# Patient Record
Sex: FEMALE | Race: BLACK OR AFRICAN AMERICAN | NOT HISPANIC OR LATINO | Employment: UNEMPLOYED | URBAN - METROPOLITAN AREA
[De-identification: names, ages, dates, MRNs, and addresses within clinical notes are randomized per-mention and may not be internally consistent; named-entity substitution may affect disease eponyms.]

---

## 2017-11-11 ENCOUNTER — HOSPITAL ENCOUNTER (EMERGENCY)
Facility: HOSPITAL | Age: 47
Discharge: HOME | End: 2017-11-11
Attending: EMERGENCY MEDICINE | Admitting: EMERGENCY MEDICINE
Payer: COMMERCIAL

## 2017-11-11 VITALS
HEIGHT: 64 IN | OXYGEN SATURATION: 99 % | HEART RATE: 93 BPM | BODY MASS INDEX: 29.02 KG/M2 | WEIGHT: 170 LBS | RESPIRATION RATE: 18 BRPM | SYSTOLIC BLOOD PRESSURE: 145 MMHG | DIASTOLIC BLOOD PRESSURE: 83 MMHG | TEMPERATURE: 97.9 F

## 2017-11-11 DIAGNOSIS — K04.7 DENTAL ABSCESS: Primary | ICD-10-CM

## 2017-11-11 PROCEDURE — 99282 EMERGENCY DEPT VISIT SF MDM: CPT

## 2017-11-11 RX ORDER — IBUPROFEN 600 MG/1
600 TABLET ORAL EVERY 6 HOURS PRN
Qty: 30 TABLET | Refills: 0 | Status: SHIPPED | OUTPATIENT
Start: 2017-11-11

## 2017-11-11 RX ORDER — IBUPROFEN 600 MG/1
600 TABLET ORAL ONCE
Status: COMPLETED | OUTPATIENT
Start: 2017-11-11 | End: 2017-11-11

## 2017-11-11 RX ORDER — CLINDAMYCIN HYDROCHLORIDE 150 MG/1
300 CAPSULE ORAL ONCE
Status: COMPLETED | OUTPATIENT
Start: 2017-11-11 | End: 2017-11-11

## 2017-11-11 RX ORDER — CLINDAMYCIN HYDROCHLORIDE 300 MG/1
300 CAPSULE ORAL 4 TIMES DAILY
Qty: 40 CAPSULE | Refills: 0 | Status: SHIPPED | OUTPATIENT
Start: 2017-11-11 | End: 2017-11-21

## 2017-11-11 RX ADMIN — CLINDAMYCIN HYDROCHLORIDE 300 MG: 150 CAPSULE ORAL at 22:45

## 2017-11-11 RX ADMIN — IBUPROFEN 600 MG: 600 TABLET ORAL at 22:45

## 2017-11-12 NOTE — DISCHARGE INSTRUCTIONS
Dental Abscess   WHAT YOU NEED TO KNOW:   A dental abscess is a collection of pus in or around a tooth  A dental abscess is caused by bacteria  The bacteria usually enter the tooth when the enamel (outer part of the tooth) is damaged by tooth decay  Bacteria may also enter the tooth through a break or chip in the tooth, or a cut in the gum  Food particles that are stuck between the teeth for a long time may also lead to an abscess  DISCHARGE INSTRUCTIONS:   Return to the emergency department if:   · You have severe pain  · You have trouble breathing because of pain or swelling  Contact your healthcare provider if:   · Your symptoms get worse, even after treatment  · Your mouth is bleeding  · You cannot eat or drink because of pain or swelling  · Your abscess returns  · You have an injury that causes a crack in your tooth  · You have questions or concerns about your condition or care  Medicines: You may  need any of the following:  · Antibiotics  help treat a bacterial infection  · NSAIDs , such as ibuprofen, help decrease swelling, pain, and fever  This medicine is available with or without a doctor's order  NSAIDs can cause stomach bleeding or kidney problems in certain people  If you take blood thinner medicine, always ask your healthcare provider if NSAIDs are safe for you  Always read the medicine label and follow directions  · Acetaminophen  decreases pain and fever  It is available without a doctor's order  Ask how much to take and how often to take it  Follow directions  Read the labels of all other medicines you are using to see if they also contain acetaminophen, or ask your doctor or pharmacist  Acetaminophen can cause liver damage if not taken correctly  Do not use more than 4 grams (4,000 milligrams) total of acetaminophen in one day  · Prescription pain medicine  may be given  Ask your healthcare provider how to take this medicine safely   Some prescription pain medicines contain acetaminophen  Do not take other medicines that contain acetaminophen without talking to your healthcare provider  Too much acetaminophen may cause liver damage  Prescription pain medicine may cause constipation  Ask your healthcare provider how to prevent or treat constipation  · Take your medicine as directed  Contact your healthcare provider if you think your medicine is not helping or if you have side effects  Tell him of her if you are allergic to any medicine  Keep a list of the medicines, vitamins, and herbs you take  Include the amounts, and when and why you take them  Bring the list or the pill bottles to follow-up visits  Carry your medicine list with you in case of an emergency  Self-care:   · Rinse your mouth every 2 hours with salt water  This will help keep the area clean  · Gently brush your teeth twice a day with a soft tooth brush  This will help keep the area clean  · Eat soft foods as directed  Soft foods may cause less pain  Examples include applesauce, yogurt, and cooked pasta  Ask your healthcare provider how long to follow this instruction  · Apply a warm compress to your tooth or gum  Use a cotton ball or gauze soaked in warm water  Remove the compress in 10 minutes or when it becomes cool  Repeat 3 times a day  Prevent another abscess:   · Brush your teeth at least 2 times a day with fluoride toothpaste  · Use dental floss to clean between your teeth at least once a day  · Rinse your mouth with water or mouthwash after meals and snacks  · Chew sugarless gum after meals and snacks  · Limit foods that are sticky and high in sugar such as raisons  Also limit drinks high in sugar, such as soda  · See your dentist every 6 months for dental cleanings and oral exams  Follow up with your healthcare provider in 24 hours: Your healthcare provider will need to check your teeth and gums   Write down your questions so you remember to ask them during your visits  © 2017 [employer] Information is for End User's use only and may not be sold, redistributed or otherwise used for commercial purposes  All illustrations and images included in CareNotes® are the copyrighted property of [employer]  or [employer]  The above information is an  only  It is not intended as medical advice for individual conditions or treatments  Talk to your doctor, nurse or pharmacist before following any medical regimen to see if it is safe and effective for you

## 2017-11-17 NOTE — ED PROVIDER NOTES
History  Chief Complaint   Patient presents with    Dental Pain     Pt c/o of Upper left tooth pain from tooth that had previously cracked  53-year-old female patient presents emergency department for evaluation of tooth pain  Patient has a cracked tooth  The patient states that she has tried to see dentist but was unable to  Patient will be treated for presumptive dental abscess  History provided by:  Patient   used: No    Dental Pain   Location:  Upper  Quality:  Aching  Severity:  Moderate  Onset quality:  Gradual  Timing:  Constant  Progression:  Worsening  Chronicity:  New  Context: not abscess, not crown fracture, not dental caries, not enamel fracture, filling intact, not intrusion, not malocclusion, normal dentition and not recent dental surgery    Relieved by:  Nothing  Worsened by:  Nothing  Ineffective treatments:  None tried  Associated symptoms: no difficulty swallowing, no headaches and no neck pain        None       History reviewed  No pertinent past medical history  History reviewed  No pertinent surgical history  History reviewed  No pertinent family history  I have reviewed and agree with the history as documented  Social History   Substance Use Topics    Smoking status: Never Smoker    Smokeless tobacco: Never Used    Alcohol use No        Review of Systems   Musculoskeletal: Negative for neck pain  Neurological: Negative for headaches  All other systems reviewed and are negative        Physical Exam  ED Triage Vitals   Temperature Pulse Respirations Blood Pressure SpO2   11/11/17 2237 11/11/17 2235 11/11/17 2235 11/11/17 2235 11/11/17 2235   97 9 °F (36 6 °C) 93 18 145/83 99 %      Temp Source Heart Rate Source Patient Position - Orthostatic VS BP Location FiO2 (%)   11/11/17 2237 11/11/17 2235 11/11/17 2235 11/11/17 2235 --   Temporal Monitor Lying Right arm       Pain Score       --                  Orthostatic Vital Signs  Vitals: 11/11/17 2235   BP: 145/83   Pulse: 93   Patient Position - Orthostatic VS: Lying       Physical Exam   Constitutional: She is oriented to person, place, and time  She appears well-developed and well-nourished  HENT:   Head: Normocephalic and atraumatic  Right Ear: External ear normal    Left Ear: External ear normal    Eyes: Conjunctivae and EOM are normal    Neck: No JVD present  No tracheal deviation present  No thyromegaly present  Cardiovascular: Normal rate  Pulmonary/Chest: Effort normal and breath sounds normal  No stridor  Abdominal: Soft  She exhibits no distension and no mass  There is no tenderness  There is no guarding  No hernia  Musculoskeletal: Normal range of motion  She exhibits no edema, tenderness or deformity  Lymphadenopathy:     She has no cervical adenopathy  Neurological: She is alert and oriented to person, place, and time  Skin: Skin is warm  No rash noted  No erythema  No pallor  Psychiatric: She has a normal mood and affect  Her behavior is normal    Nursing note and vitals reviewed        ED Medications  Medications   clindamycin (CLEOCIN) capsule 300 mg (300 mg Oral Given 11/11/17 2245)   ibuprofen (MOTRIN) tablet 600 mg (600 mg Oral Given 11/11/17 2245)       Diagnostic Studies  Results Reviewed     None                 No orders to display              Procedures  Procedures       Phone Contacts  ED Phone Contact    ED Course  ED Course                                MDM  Number of Diagnoses or Management Options  Dental abscess: new and requires workup     Amount and/or Complexity of Data Reviewed  Decide to obtain previous medical records or to obtain history from someone other than the patient: yes  Review and summarize past medical records: yes    Patient Progress  Patient progress: stable    CritCare Time    Disposition  Final diagnoses:   Dental abscess     Time reflects when diagnosis was documented in both MDM as applicable and the Disposition within this note     Time User Action Codes Description Comment    11/11/2017 10:40 PM Wei Galvez Add [K04 7] Dental abscess       ED Disposition     ED Disposition Condition Comment    Discharge  [facility] discharge to home/self care  Condition at discharge: Good        Follow-up Information     Follow up With Specialties Details Why Contact Info Additional Information    [facility] Emergency Department Emergency Medicine  As needed [facility] [address]  [phone] [place] ED, [address] [place] [address], [address] [place] [address]  [phone]         Discharge Medication List as of 11/11/2017 10:52 PM      START taking these medications    Details   benzocaine (HURRICAINE) 20 % 1 application by Mucosal route 3 (three) times a day as needed for mucositis, Starting Sat 11/11/2017, Print      clindamycin (CLEOCIN) 300 MG capsule Take 1 capsule by mouth 4 (four) times a day for 10 days, Starting Sat 11/11/2017, Until Tue 11/21/2017, Print      ibuprofen (MOTRIN) 600 mg tablet Take 1 tablet by mouth every 6 (six) hours as needed for mild pain, Starting Sat 11/11/2017, Print           No discharge procedures on file      ED Provider  Electronically Signed by           Steve Harman DO  11/17/17 6685

## 2017-12-07 ENCOUNTER — HOSPITAL ENCOUNTER (EMERGENCY)
Facility: HOSPITAL | Age: 47
Discharge: HOME | End: 2017-12-08
Attending: EMERGENCY MEDICINE | Admitting: EMERGENCY MEDICINE
Payer: COMMERCIAL

## 2017-12-07 VITALS
SYSTOLIC BLOOD PRESSURE: 148 MMHG | RESPIRATION RATE: 16 BRPM | HEART RATE: 89 BPM | TEMPERATURE: 98.5 F | OXYGEN SATURATION: 98 % | WEIGHT: 165 LBS | DIASTOLIC BLOOD PRESSURE: 89 MMHG | BODY MASS INDEX: 28.32 KG/M2

## 2017-12-07 DIAGNOSIS — Z72.0 TOBACCO ABUSE: ICD-10-CM

## 2017-12-07 DIAGNOSIS — J06.9 URI (UPPER RESPIRATORY INFECTION): Primary | ICD-10-CM

## 2017-12-08 ENCOUNTER — APPOINTMENT (EMERGENCY)
Dept: RADIOLOGY | Facility: HOSPITAL | Age: 47
End: 2017-12-08
Payer: COMMERCIAL

## 2017-12-08 PROCEDURE — 99283 EMERGENCY DEPT VISIT LOW MDM: CPT

## 2017-12-08 PROCEDURE — 71020 HB CHEST X-RAY 2VW FRONTAL&LATL: CPT

## 2017-12-08 RX ORDER — PREDNISONE 20 MG/1
60 TABLET ORAL DAILY
Qty: 15 TABLET | Refills: 0 | Status: SHIPPED | OUTPATIENT
Start: 2017-12-08 | End: 2017-12-13

## 2017-12-08 RX ORDER — LEVOFLOXACIN 500 MG/1
500 TABLET, FILM COATED ORAL DAILY
Qty: 10 TABLET | Refills: 0 | Status: SHIPPED | OUTPATIENT
Start: 2017-12-08 | End: 2017-12-18

## 2017-12-08 RX ORDER — ALBUTEROL SULFATE 90 UG/1
2 AEROSOL, METERED RESPIRATORY (INHALATION) ONCE
Status: COMPLETED | OUTPATIENT
Start: 2017-12-08 | End: 2017-12-08

## 2017-12-08 RX ORDER — PREDNISONE 20 MG/1
60 TABLET ORAL ONCE
Status: COMPLETED | OUTPATIENT
Start: 2017-12-08 | End: 2017-12-08

## 2017-12-08 RX ORDER — ALBUTEROL SULFATE 90 UG/1
2 AEROSOL, METERED RESPIRATORY (INHALATION) EVERY 4 HOURS PRN
Qty: 1 INHALER | Refills: 0 | Status: SHIPPED | OUTPATIENT
Start: 2017-12-08 | End: 2020-05-21

## 2017-12-08 RX ADMIN — PREDNISONE 60 MG: 20 TABLET ORAL at 00:14

## 2017-12-08 RX ADMIN — ALBUTEROL SULFATE 2 PUFF: 90 AEROSOL, METERED RESPIRATORY (INHALATION) at 00:16

## 2017-12-08 NOTE — ED PROVIDER NOTES
History  Chief Complaint   Patient presents with    Cough     pt started with a cough today as well as a headache and pain in her chest when she coughs     59-year-old female patient presents emergency department for evaluation of URI symptoms which has been present for approximately 12 hours  The patient is a pack-per-day smoker, has been smoking since she was 14years old this current 49years old  The patient does have symptoms consistent with COPD which are likely exacerbated by URI  Her significant other in the room was diagnosed last night with pneumonia  Patient had an x-ray done as she does have adventitious breath sounds equally bilaterally which are mode as a combination of rales and wheezing  The patient has aware of this, did discuss with her smoking cessation, I spent greater than 10 minutes discussing this with her, she stated that she was trying to get on to Chantix per primary care provider was unable to do so  I will provide the patient's information on contact for assistance in smoking cessation  History provided by:  Patient   used: No    Cough   Cough characteristics:  Productive  Sputum characteristics:  Nondescript  Severity:  Mild  Onset quality:  Gradual  Timing:  Constant  Progression:  Worsening  Chronicity:  New  Smoker: yes    Context: upper respiratory infection    Context: not exposure to allergens, not fumes, not sick contacts and not smoke exposure    Relieved by:  Nothing  Worsened by:  Nothing  Ineffective treatments:  None tried  Associated symptoms: no chest pain, no diaphoresis, no ear pain, no myalgias, no rash, no shortness of breath and no sinus congestion        Prior to Admission Medications   Prescriptions Last Dose Informant Patient Reported?  Taking?   benzocaine (HURRICAINE) 20 %   No No   Si application by Mucosal route 3 (three) times a day as needed for mucositis   ibuprofen (MOTRIN) 600 mg tablet   No No   Sig: Take 1 tablet by mouth every 6 (six) hours as needed for mild pain      Facility-Administered Medications: None       History reviewed  No pertinent past medical history  Past Surgical History:   Procedure Laterality Date    HYSTERECTOMY         History reviewed  No pertinent family history  I have reviewed and agree with the history as documented  Social History   Substance Use Topics    Smoking status: Current Every Day Smoker     Packs/day: 0 20    Smokeless tobacco: Never Used    Alcohol use No        Review of Systems   Constitutional: Negative for diaphoresis  HENT: Negative for ear pain  Respiratory: Positive for cough  Negative for shortness of breath  Cardiovascular: Negative for chest pain  Musculoskeletal: Negative for myalgias  Skin: Negative for rash  All other systems reviewed and are negative  Physical Exam  ED Triage Vitals [12/07/17 2352]   Temperature Pulse Respirations Blood Pressure SpO2   98 5 °F (36 9 °C) 89 16 148/89 98 %      Temp Source Heart Rate Source Patient Position - Orthostatic VS BP Location FiO2 (%)   Oral Monitor Sitting Right arm --      Pain Score       --           Orthostatic Vital Signs  Vitals:    12/07/17 2352   BP: 148/89   Pulse: 89   Patient Position - Orthostatic VS: Sitting       Physical Exam   Constitutional: She is oriented to person, place, and time  She appears well-developed and well-nourished  HENT:   Head: Normocephalic and atraumatic  Right Ear: External ear normal    Left Ear: External ear normal    Eyes: Conjunctivae and EOM are normal    Neck: No JVD present  No tracheal deviation present  No thyromegaly present  Cardiovascular: Normal rate  Pulmonary/Chest: Effort normal  No stridor  She has wheezes  She has rales  Abdominal: Soft  She exhibits no distension and no mass  There is no tenderness  There is no guarding  No hernia  Musculoskeletal: Normal range of motion  She exhibits no edema, tenderness or deformity  Lymphadenopathy:     She has no cervical adenopathy  Neurological: She is alert and oriented to person, place, and time  Skin: Skin is warm  No rash noted  No erythema  No pallor  Psychiatric: She has a normal mood and affect  Her behavior is normal    Nursing note and vitals reviewed  ED Medications  Medications   albuterol (PROVENTIL HFA,VENTOLIN HFA) inhaler 2 puff (not administered)   predniSONE tablet 60 mg (not administered)       Diagnostic Studies  Results Reviewed     None                 XR chest 2 views    (Results Pending)              Procedures  Procedures       Phone Contacts  ED Phone Contact    ED Course  ED Course                                MDM  Number of Diagnoses or Management Options  Tobacco abuse: new and requires workup  URI (upper respiratory infection): new and requires workup     Amount and/or Complexity of Data Reviewed  Clinical lab tests: ordered and reviewed  Tests in the radiology section of CPT®: ordered and reviewed  Decide to obtain previous medical records or to obtain history from someone other than the patient: yes  Review and summarize past medical records: yes    Patient Progress  Patient progress: stable    CritCare Time    Disposition  Final diagnoses:   None     ED Disposition     None      Follow-up Information    None       Patient's Medications   Discharge Prescriptions    No medications on file     No discharge procedures on file      ED Provider  Electronically Signed by           Audrey Leigh DO  12/08/17 6293

## 2017-12-08 NOTE — DISCHARGE INSTRUCTIONS
Acute Bronchitis   WHAT YOU NEED TO KNOW:   Acute bronchitis is swelling and irritation in the air passages of your lungs  This irritation may cause you to cough or have other breathing problems  Acute bronchitis often starts because of another illness, such as a cold or the flu  The illness spreads from your nose and throat to your windpipe and airways  Bronchitis is often called a chest cold  Acute bronchitis lasts about 3 to 6 weeks and is usually not a serious illness  Your cough can last for several weeks  DISCHARGE INSTRUCTIONS:   Return to the emergency department if:   · You cough up blood  · Your lips or fingernails turn blue  · You feel like you are not getting enough air when you breathe  Contact your healthcare provider if:   · You have a fever  · Your breathing problems do not go away or get worse  · Your cough does not get better within 4 weeks  · You have questions or concerns about your condition or care  Self-care:   · Get more rest   Rest helps your body to heal  Slowly start to do more each day  Rest when you feel it is needed  · Avoid irritants in the air  Avoid chemicals, fumes, and dust  Wear a face mask if you must work around dust or fumes  Stay inside on days when air pollution levels are high  If you have allergies, stay inside when pollen counts are high  Do not use aerosol products, such as spray-on deodorant, bug spray, and hair spray  · Do not smoke or be around others who smoke  Nicotine and other chemicals in cigarettes and cigars damages the cilia that move mucus out of your lungs  Ask your healthcare provider for information if you currently smoke and need help to quit  E-cigarettes or smokeless tobacco still contain nicotine  Talk to your healthcare provider before you use these products  · Drink liquids as directed  Liquids help keep your air passages moist and help you cough up mucus   You may need to drink more liquids when you have acute bronchitis  Ask how much liquid to drink each day and which liquids are best for you  · Use a humidifier or vaporizer  Use a cool mist humidifier or a vaporizer to increase air moisture in your home  This may make it easier for you to breathe and help decrease your cough  Decrease risk for acute bronchitis:   · Get the vaccinations you need  Ask your healthcare provider if you should get vaccinated against the flu or pneumonia  · Prevent the spread of germs  You can decrease your risk of acute bronchitis and other illnesses by doing the following:     [facility] [facility] your hands often with soap and water  Carry germ-killing hand lotion or gel with you  You can use the lotion or gel to clean your hands when soap and water are not available  ¨ Do not touch your eyes, nose, or mouth unless you have washed your hands first     ¨ Always cover your mouth when you cough to prevent the spread of germs  It is best to cough into a tissue or your shirt sleeve instead of into your hand  Ask those around you cover their mouths when they cough  ¨ Try to avoid people who have a cold or the flu  If you are sick, stay away from others as much as possible  Medicines: Your healthcare provider may  give you any of the following:  · Ibuprofen or acetaminophen  are medicines that help lower your fever  They are available without a doctor's order  Ask your healthcare provider which medicine is right for you  Ask how much to take and how often to take it  Follow directions  These medicines can cause stomach bleeding if not taken correctly  Ibuprofen can cause kidney damage  Do not take ibuprofen if you have kidney disease, an ulcer, or allergies to aspirin  Acetaminophen can cause liver damage  Do not take more than 4,000 milligrams in 24 hours  · Decongestants  help loosen mucus in your lungs and make it easier to cough up  This can help you breathe easier  · Cough suppressants  decrease your urge to cough   If your cough produces mucus, do not take a cough suppressant unless your healthcare provider tells you to  Your healthcare provider may suggest that you take a cough suppressant at night so you can rest     · Inhalers  may be given  Your healthcare provider may give you one or more inhalers to help you breathe easier and cough less  An inhaler gives your medicine to open your airways  Ask your healthcare provider to show you how to use your inhaler correctly  · Take your medicine as directed  Contact your healthcare provider if you think your medicine is not helping or if you have side effects  Tell him of her if you are allergic to any medicine  Keep a list of the medicines, vitamins, and herbs you take  Include the amounts, and when and why you take them  Bring the list or the pill bottles to follow-up visits  Carry your medicine list with you in case of an emergency  Follow up with your healthcare provider as directed:  Write down questions you have so you will remember to ask them during your follow-up visits  © 2017 [address] Information is for End User's use only and may not be sold, redistributed or otherwise used for commercial purposes  All illustrations and images included in CareNotes® are the copyrighted property of [employer]  or [employer]  The above information is an  only  It is not intended as medical advice for individual conditions or treatments  Talk to your doctor, nurse or pharmacist before following any medical regimen to see if it is safe and effective for you  Cigarette Smoking and Your Health, Ambulatory Care   GENERAL INFORMATION:   What are the risks to my health if I smoke? Chemicals in tobacco are addictive and damage every cell in your body  All tobacco products are dangerous to you and to nonsmokers who breathe your secondhand smoke   Even if you are a light smoker or a social smoker, you have an increased risk for cancer, heart disease, and lung disease  If you are pregnant or have diabetes, smoking increases your risk for complications  What are the benefits to my health if I stop smoking? · Lower risk of cancer, heart disease, blood clots, heart attack, and stroke    · Lower risk of diabetes complications, such as kidney, artery, or eye disease, and nerve damage that can result in amputations    · Lower risk of lung infections and diseases, such as pneumonia, asthma, chronic bronchitis, and emphysema           · Increased benefits of chemotherapy if you already have cancer, and decreased risk for cancer returning after treatment    · Improved circulation, allowing more oxygen to be delivered to your body    · Improved ability to heal and to fight infections  What are the benefits to the health of others if I stop smoking? · Lower risk of lung cancer and heart disease in nonsmokers    · Lower risk of miscarriage, early delivery, low birth weight, and stillbirth    · Lower risk of SIDS, obesity, developmental delay, ear infections, colds, pneumonia, bronchitis, asthma, and ADHD in your child  Where can I find more information and support to stop smoking? It is never too late to stop smoking  Ask your healthcare provider for more information if you need help  · [employer]  Phone: 1- 189 - [phone]  Web Address: www [employer]  Follow up with your healthcare provider as directed:  Write down your questions so you remember to ask them during your visits  CARE AGREEMENT:   You have the right to help plan your care  Learn about your health condition and how it may be treated  Discuss treatment options with your caregivers to decide what care you want to receive  You always have the right to refuse treatment  The above information is an  only  It is not intended as medical advice for individual conditions or treatments   Talk to your doctor, nurse or pharmacist before following any medical regimen to see if it is safe and effective for you  © 2014 [address] is for End User's use only and may not be sold, redistributed or otherwise used for commercial purposes  All illustrations and images included in CareNotes® are the copyrighted property of [employer]  or Hiram Nova  How to Quit Using Smokeless Tobacco   AMBULATORY CARE:   Why it is important to stop using smokeless tobacco:  Smokeless tobacco comes in many forms  Examples include chew, snuff, dip, dissolvable tobacco, and snus  All smokeless tobacco products contain nicotine and may contain as much nicotine as 3 cigarettes  You may be physically dependent on nicotine  You may also be emotionally addicted to it  The cravings can be strong, but it is important to quit using smokeless tobacco  You will improve your health and decrease your cancer, stroke, and heart attack risk  Mouth sores and tooth problems will also improve when you quit  You can benefit from quitting no matter how long you have used smokeless tobacco    Signs and symptoms of nicotine withdrawal include the following:   · Dizziness, headaches, or tiredness     · Depression, anxiety, or irritability     · Trouble sleeping or concentrating     · Feeling restless or bored  Prepare to stop using smokeless tobacco:  Nicotine is a highly addictive drug  Withdrawal symptoms can happen when you stop and make it hard to quit  The following can help keep you on track:  · Set a quit date  If possible, set the date about 3 to 4 weeks in the future  This will help you prepare your home and routine for the change  Do not set the date too far away  You might change your mind or lose your resolve to quit  Know the triggers that tempt you, and make a plan to avoid them  · Tell friends, family, and coworkers that you plan to quit  Explain that you may have withdrawal symptoms when you quit  Ask them to support you   They may be able to encourage you and help reduce your stress to make it easier for you to quit  Ask them not to use any tobacco products around you  Do not allow them to use tobacco products in your home or car  · Remove all smokeless tobacco products from your home, car, and workplace  Remove anything else that will tempt you  Tools to help you quit:   · Counseling  from a healthcare provider can provide you with support and skills to quit  The counselor can also teach you to manage your withdrawal symptoms and cravings  He may help you learn methods such as meditation that can help you feel less anxious or jittery  You may receive counseling from one counselor, in group therapy, or through phone therapy called a quit line  · Taper down  means you use less smokeless tobacco each day until your body no longer craves the nicotine  You can also reduce the number of places you use it or use a different kind that has less nicotine  Wait as long as possible before you use smokeless tobacco when you have a craving  You may be able to increase the amount of time you can wait after each craving  This will help decrease the amount you use and the number of cravings each day  · Nicotine replacement therapy (NRT)  such as patches, gum, or lozenges may help reduce your nicotine cravings and withdrawal symptoms  NRT is available without a doctor's order  Follow directions so you do not get too much nicotine  An overdose can be life-threatening  Do not switch to cigarettes as a way to quit using smokeless tobacco  If you are pregnant or have heart disease, talk to your healthcare provider before you start NRT  He may recommend other ways to quit, or he may want you to come in for follow-up visits while you use NRT  · Prescription medicines  such as nasal sprays or nicotine inhalers may help reduce your withdrawal symptoms  Ask your healthcare provider about these and other medicines to help reduce cravings   You may need to start certain medicines 2 weeks before your quit date for them to work well  · Chew sugarless gum or sunflower seeds  as a substitute for smokeless tobacco   Manage weight gain after you quit:  Nicotine can affect your metabolism  You may gain a few pounds after you quit  The following can help you control your weight:  · Eat healthy foods  Healthy foods include fruits, vegetables, whole-grain breads, low-fat dairy products, beans, lean meats, and fish  You may also find it helpful to chew sugarless gum or eat healthy snacks  · Drink water before, during, and between meals  This will make your stomach feel full and help prevent you from overeating  Ask your healthcare provider how much liquid to drink each day and which liquids are best for you  · Exercise as directed  Exercise may help reduce cravings and stress from nicotine withdrawal  Take a walk or do some kind of exercise every day  © 2017 [address]  Information is for End User's use only and may not be sold, redistributed or otherwise used for commercial purposes  All illustrations and images included in CareNotes® are the copyrighted property of [employer]  or Hiram Nova  The above information is an  only  It is not intended as medical advice for individual conditions or treatments  Talk to your doctor, nurse or pharmacist before following any medical regimen to see if it is safe and effective for you  How to Stop Smoking   WHAT YOU NEED TO KNOW:   You will improve your health and the health of others around you if you stop smoking  Your risk for heart and lung disease, cancer, stroke, heart attack, and vision problems will also decrease  You can benefit from quitting no matter how long you have smoked  DISCHARGE INSTRUCTIONS:   Prepare to stop smoking:  Nicotine is a highly addictive drug found in cigarettes  Withdrawal symptoms can happen when you stop smoking and make it hard to quit   These include anxiety, depression, irritability, trouble sleeping, and increased appetite  You increase your chances of success if you prepare to quit  · Set a quit date  [place] a date that is within the next 2 weeks  Do not pick a day that you think may be stressful or busy  Write down the day or [place] it on your calender  · Tell friends and family that you plan to quit  Explain that you may have withdrawal symptoms when you try to quit  Ask them to support you  They may be able to encourage you and help reduce your stress to make it easier for you to quit  · Make a list of your reasons for quitting  Put the list somewhere you will see it every day, such as your refrigerator  You can look at the list when you have a craving  · Remove all tobacco and nicotine products from your home, car, and workplace  Also, remove anything else that will tempt you to smoke, such as lighters, matches, or ashtrays  Clean your car, home, and places at work that smell like smoke  The smell of smoke can trigger a craving  · Identify triggers that make you want to smoke  This may include activities, feelings, or people  Also write down 1 way you can deal with each of your triggers  For example, if you want to smoke as soon as you wake up, plan another activity during this time, such as exercise  · Make a plan for how you will quit  Learn about the tools that can help you quit, such as medicine, counseling, or nicotine replacement therapy  Choose at least 2 options to help you quit  Tools to help you stop smoking:   · Counseling  from a trained healthcare provider can provide you with support and skills to quit smoking  The provider will also teach you to manage your withdrawal symptoms and cravings  You may receive counseling from one counselor, in group therapy, or through phone therapy called a quit line  · Nicotine replacement therapy (NRT)  such as nicotine patches, gum, or lozenges may help reduce your nicotine cravings   You may get these without a doctor's order  Do not use e-cigarettes or smokeless tobacco in place of cigarettes or to help you quit  They still contain nicotine  · Prescription medicines  such as nasal sprays or nicotine inhalers may help reduce your withdrawal symptoms  Other medicines may also be used to reduce your urge to smoke  Ask your healthcare provider about these medicines  You may need to start certain medicines 2 weeks before your quit date for them to work well  · Hypnosis  is a practice that helps guide you through thoughts and feelings  Hypnosis may help decrease your cravings and make you more willing to quit  · Acupuncture therapy  uses very thin needles to balance energy channels in the body  This is thought to help decrease cravings and symptoms of nicotine withdrawal      · Support groups  let you talk to others who are trying to quit or have already quit  It may be helpful to speak with others about how they quit  Manage your cravings:   · Avoid situations, people, and places that tempt you to smoke  Go to nonsmoking places, such as libraries or restaurants  Understand what tempts you and try to avoid these things  · Keep your hands busy  Hold things such as a stress ball or pen  · Put candy or toothpicks in your mouth  Keep lollipops, sugarless gum, or toothpicks with you at all times  · Do not have alcohol or caffeine  These drinks may tempt you to smoke  Drink healthy liquids such as water or juice instead  · Reward yourself when you resist your cravings  Rewards will motivate you and help you stay positive  · Do an activity that distracts you from your craving  Examples include going for a walk, exercising, or cleaning  Prevent weight gain after you quit:  You may gain a few pounds after you quit smoking  It is healthier for you to gain a few pounds than to continue to smoke  The following can help you prevent weight gain:  · Eat healthy foods    These include fruits, vegetables, whole-grain breads, low-fat dairy products, beans, lean meats, and fish  Eat healthy snacks, such as low-fat yogurt, if you get hungry between meals  · Drink water before, during, and between meals  This will make your stomach feel full and help prevent you from overeating  Ask your healthcare provider how much liquid to drink each day and which liquids are best for you  · Exercise  Take a walk or do some kind of exercise every day  Ask your healthcare provider what exercise is right for you  This may help reduce your cravings and reduce stress  For support and more information:   · [employer]  Phone: 9- 127 - 797-2168  Web Address: www [employer]  © 2017 [address] Information is for End User's use only and may not be sold, redistributed or otherwise used for commercial purposes  All illustrations and images included in CareNotes® are the copyrighted property of [employer]  or Hiram Nova  The above information is an  only  It is not intended as medical advice for individual conditions or treatments  Talk to your doctor, nurse or pharmacist before following any medical regimen to see if it is safe and effective for you

## 2017-12-09 ENCOUNTER — HOSPITAL ENCOUNTER (EMERGENCY)
Facility: HOSPITAL | Age: 47
Discharge: HOME | End: 2017-12-09
Attending: EMERGENCY MEDICINE | Admitting: EMERGENCY MEDICINE
Payer: COMMERCIAL

## 2017-12-09 VITALS
TEMPERATURE: 98.5 F | OXYGEN SATURATION: 97 % | SYSTOLIC BLOOD PRESSURE: 136 MMHG | HEART RATE: 85 BPM | RESPIRATION RATE: 19 BRPM | DIASTOLIC BLOOD PRESSURE: 70 MMHG

## 2017-12-09 DIAGNOSIS — R05.9 COUGH IN ADULT: ICD-10-CM

## 2017-12-09 DIAGNOSIS — J06.9 URI, ACUTE: Primary | ICD-10-CM

## 2017-12-09 LAB
ALBUMIN SERPL BCP-MCNC: 3.7 G/DL (ref 3.5–5)
ALP SERPL-CCNC: 91 U/L (ref 46–116)
ALT SERPL W P-5'-P-CCNC: 24 U/L (ref 12–78)
ANION GAP SERPL CALCULATED.3IONS-SCNC: 10 MMOL/L (ref 4–13)
AST SERPL W P-5'-P-CCNC: 21 U/L (ref 5–45)
BASOPHILS # BLD AUTO: 0.01 THOUSANDS/ΜL (ref 0–0.1)
BASOPHILS NFR BLD AUTO: 0 % (ref 0–1)
BILIRUB SERPL-MCNC: 0.2 MG/DL (ref 0.2–1)
BUN SERPL-MCNC: 26 MG/DL (ref 5–25)
CALCIUM SERPL-MCNC: 9.1 MG/DL (ref 8.3–10.1)
CHLORIDE SERPL-SCNC: 103 MMOL/L (ref 100–108)
CO2 SERPL-SCNC: 27 MMOL/L (ref 21–32)
CREAT SERPL-MCNC: 0.83 MG/DL (ref 0.6–1.3)
EOSINOPHIL # BLD AUTO: 0 THOUSAND/ΜL (ref 0–0.61)
EOSINOPHIL NFR BLD AUTO: 0 % (ref 0–6)
ERYTHROCYTE [DISTWIDTH] IN BLOOD BY AUTOMATED COUNT: 14.6 % (ref 11.6–15.1)
GFR SERPL CREATININE-BSD FRML MDRD: 96 ML/MIN/1.73SQ M
GLUCOSE SERPL-MCNC: 127 MG/DL (ref 65–140)
HCT VFR BLD AUTO: 36.5 % (ref 34.8–46.1)
HGB BLD-MCNC: 11.9 G/DL (ref 11.5–15.4)
LYMPHOCYTES # BLD AUTO: 0.66 THOUSANDS/ΜL (ref 0.6–4.47)
LYMPHOCYTES NFR BLD AUTO: 6 % (ref 14–44)
MCH RBC QN AUTO: 27.5 PG (ref 26.8–34.3)
MCHC RBC AUTO-ENTMCNC: 32.6 G/DL (ref 31.4–37.4)
MCV RBC AUTO: 84 FL (ref 82–98)
MONOCYTES # BLD AUTO: 1.18 THOUSAND/ΜL (ref 0.17–1.22)
MONOCYTES NFR BLD AUTO: 11 % (ref 4–12)
NEUTROPHILS # BLD AUTO: 9.07 THOUSANDS/ΜL (ref 1.85–7.62)
NEUTS SEG NFR BLD AUTO: 83 % (ref 43–75)
NRBC BLD AUTO-RTO: 0 /100 WBCS
PLATELET # BLD AUTO: 194 THOUSANDS/UL (ref 149–390)
PMV BLD AUTO: 11.4 FL (ref 8.9–12.7)
POTASSIUM SERPL-SCNC: 4 MMOL/L (ref 3.5–5.3)
PROT SERPL-MCNC: 8.4 G/DL (ref 6.4–8.2)
RBC # BLD AUTO: 4.33 MILLION/UL (ref 3.81–5.12)
SODIUM SERPL-SCNC: 140 MMOL/L (ref 136–145)
TROPONIN I SERPL-MCNC: <0.02 NG/ML
WBC # BLD AUTO: 10.98 THOUSAND/UL (ref 4.31–10.16)

## 2017-12-09 PROCEDURE — 85025 COMPLETE CBC W/AUTO DIFF WBC: CPT | Performed by: EMERGENCY MEDICINE

## 2017-12-09 PROCEDURE — 99283 EMERGENCY DEPT VISIT LOW MDM: CPT

## 2017-12-09 PROCEDURE — 84484 ASSAY OF TROPONIN QUANT: CPT | Performed by: EMERGENCY MEDICINE

## 2017-12-09 PROCEDURE — 96374 THER/PROPH/DIAG INJ IV PUSH: CPT

## 2017-12-09 PROCEDURE — 36415 COLL VENOUS BLD VENIPUNCTURE: CPT | Performed by: EMERGENCY MEDICINE

## 2017-12-09 PROCEDURE — 93005 ELECTROCARDIOGRAM TRACING: CPT | Performed by: EMERGENCY MEDICINE

## 2017-12-09 PROCEDURE — 80053 COMPREHEN METABOLIC PANEL: CPT | Performed by: EMERGENCY MEDICINE

## 2017-12-09 RX ORDER — BENZONATATE 100 MG/1
100 CAPSULE ORAL 3 TIMES DAILY PRN
Status: DISCONTINUED | OUTPATIENT
Start: 2017-12-09 | End: 2017-12-09 | Stop reason: HOSPADM

## 2017-12-09 RX ORDER — ALBUTEROL SULFATE 2.5 MG/3ML
2.5 SOLUTION RESPIRATORY (INHALATION) ONCE
Status: DISCONTINUED | OUTPATIENT
Start: 2017-12-09 | End: 2017-12-09 | Stop reason: HOSPADM

## 2017-12-09 RX ORDER — KETOROLAC TROMETHAMINE 30 MG/ML
30 INJECTION, SOLUTION INTRAMUSCULAR; INTRAVENOUS ONCE
Status: COMPLETED | OUTPATIENT
Start: 2017-12-09 | End: 2017-12-09

## 2017-12-09 RX ORDER — BENZONATATE 100 MG/1
100 CAPSULE ORAL EVERY 8 HOURS
Qty: 21 CAPSULE | Refills: 0 | Status: ON HOLD | OUTPATIENT
Start: 2017-12-09 | End: 2020-05-27

## 2017-12-09 RX ADMIN — KETOROLAC TROMETHAMINE 30 MG: 30 INJECTION, SOLUTION INTRAMUSCULAR at 04:01

## 2017-12-09 RX ADMIN — BENZONATATE 100 MG: 100 CAPSULE ORAL at 04:01

## 2017-12-09 NOTE — DISCHARGE INSTRUCTIONS
Acute Bronchitis   WHAT YOU NEED TO KNOW:   Acute bronchitis is swelling and irritation in the air passages of your lungs  This irritation may cause you to cough or have other breathing problems  Acute bronchitis often starts because of another illness, such as a cold or the flu  The illness spreads from your nose and throat to your windpipe and airways  Bronchitis is often called a chest cold  Acute bronchitis lasts about 3 to 6 weeks and is usually not a serious illness  Your cough can last for several weeks  DISCHARGE INSTRUCTIONS:   Return to the emergency department if:   · You cough up blood  · Your lips or fingernails turn blue  · You feel like you are not getting enough air when you breathe  Contact your healthcare provider if:   · You have a fever  · Your breathing problems do not go away or get worse  · Your cough does not get better within 4 weeks  · You have questions or concerns about your condition or care  Self-care:   · Get more rest   Rest helps your body to heal  Slowly start to do more each day  Rest when you feel it is needed  · Avoid irritants in the air  Avoid chemicals, fumes, and dust  Wear a face mask if you must work around dust or fumes  Stay inside on days when air pollution levels are high  If you have allergies, stay inside when pollen counts are high  Do not use aerosol products, such as spray-on deodorant, bug spray, and hair spray  · Do not smoke or be around others who smoke  Nicotine and other chemicals in cigarettes and cigars damages the cilia that move mucus out of your lungs  Ask your healthcare provider for information if you currently smoke and need help to quit  E-cigarettes or smokeless tobacco still contain nicotine  Talk to your healthcare provider before you use these products  · Drink liquids as directed  Liquids help keep your air passages moist and help you cough up mucus   You may need to drink more liquids when you have acute bronchitis  Ask how much liquid to drink each day and which liquids are best for you  · Use a humidifier or vaporizer  Use a cool mist humidifier or a vaporizer to increase air moisture in your home  This may make it easier for you to breathe and help decrease your cough  Decrease risk for acute bronchitis:   · Get the vaccinations you need  Ask your healthcare provider if you should get vaccinated against the flu or pneumonia  · Prevent the spread of germs  You can decrease your risk of acute bronchitis and other illnesses by doing the following:     [facility] [place] [facility] your hands often with soap and water  Carry germ-killing hand lotion or gel with you  You can use the lotion or gel to clean your hands when soap and water are not available  ¨ Do not touch your eyes, nose, or mouth unless you have washed your hands first     ¨ Always cover your mouth when you cough to prevent the spread of germs  It is best to cough into a tissue or your shirt sleeve instead of into your hand  Ask those around you cover their mouths when they cough  ¨ Try to avoid people who have a cold or the flu  If you are sick, stay away from others as much as possible  Medicines: Your healthcare provider may  give you any of the following:  · Ibuprofen or acetaminophen  are medicines that help lower your fever  They are available without a doctor's order  Ask your healthcare provider which medicine is right for you  Ask how much to take and how often to take it  Follow directions  These medicines can cause stomach bleeding if not taken correctly  Ibuprofen can cause kidney damage  Do not take ibuprofen if you have kidney disease, an ulcer, or allergies to aspirin  Acetaminophen can cause liver damage  Do not take more than 4,000 milligrams in 24 hours  · Decongestants  help loosen mucus in your lungs and make it easier to cough up  This can help you breathe easier  · Cough suppressants  decrease your urge to cough   If your cough produces mucus, do not take a cough suppressant unless your healthcare provider tells you to  Your healthcare provider may suggest that you take a cough suppressant at night so you can rest     · Inhalers  may be given  Your healthcare provider may give you one or more inhalers to help you breathe easier and cough less  An inhaler gives your medicine to open your airways  Ask your healthcare provider to show you how to use your inhaler correctly  · Take your medicine as directed  Contact your healthcare provider if you think your medicine is not helping or if you have side effects  Tell him of her if you are allergic to any medicine  Keep a list of the medicines, vitamins, and herbs you take  Include the amounts, and when and why you take them  Bring the list or the pill bottles to follow-up visits  Carry your medicine list with you in case of an emergency  Follow up with your healthcare provider as directed:  Write down questions you have so you will remember to ask them during your follow-up visits  © 2017 [address] Information is for End User's use only and may not be sold, redistributed or otherwise used for commercial purposes  All illustrations and images included in CareNotes® are the copyrighted property of [employer]  or [employer]  The above information is an  only  It is not intended as medical advice for individual conditions or treatments  Talk to your doctor, nurse or pharmacist before following any medical regimen to see if it is safe and effective for you

## 2017-12-09 NOTE — ED PROVIDER NOTES
History  Chief Complaint   Patient presents with    Cough     Pt reports of worsening cough  Pt was seen in the ED recently for similar occurrence  50-year-old female patient presents emergency department for re-evaluation of URI symptoms  Patient states that her cough is not improving since being seen here yesterday so she came back here  Patient is slightly upset that she was not given a cough medicine however stated to her that at cough medicines which I recommended to her were over-the-counter  Of new concern, the patient did state that she has chest wide pain consistent with muscular chest pain from cough  The patient's pain is only with coughing  Because the patient has come back to the emergency department two days neuro, blood work will be done, x-ray will not be repeated but he has no adventitious breath sounds still, but the patient will have evaluation with EKG, labs, and will be disposition based on those findings  History provided by:  Patient   used: No    Cough   Cough characteristics:  Dry and non-productive  Severity:  Moderate  Onset quality:  Gradual  Timing:  Constant  Progression:  Worsening  Chronicity:  New  Smoker: yes    Relieved by:  Nothing  Worsened by:  Nothing  Ineffective treatments:  Beta-agonist inhaler  Associated symptoms: chest pain    Associated symptoms: no chills, no ear fullness, no ear pain, no rash, no shortness of breath, no sinus congestion and no sore throat        Prior to Admission Medications   Prescriptions Last Dose Informant Patient Reported? Taking?    albuterol (PROVENTIL HFA,VENTOLIN HFA) 90 mcg/act inhaler   No No   Sig: Inhale 2 puffs every 4 (four) hours as needed for wheezing   benzocaine (HURRICAINE) 20 %   No No   Si application by Mucosal route 3 (three) times a day as needed for mucositis   ibuprofen (MOTRIN) 600 mg tablet   No No   Sig: Take 1 tablet by mouth every 6 (six) hours as needed for mild pain levofloxacin (LEVAQUIN) 500 mg tablet   No No   Sig: Take 1 tablet by mouth daily for 10 days   predniSONE 20 mg tablet   No No   Sig: Take 3 tablets by mouth daily for 5 days      Facility-Administered Medications: None       History reviewed  No pertinent past medical history  Past Surgical History:   Procedure Laterality Date    HYSTERECTOMY         History reviewed  No pertinent family history  I have reviewed and agree with the history as documented  Social History   Substance Use Topics    Smoking status: Current Every Day Smoker     Packs/day: 0 20    Smokeless tobacco: Never Used    Alcohol use No        Review of Systems   Constitutional: Negative for chills  HENT: Negative for ear pain and sore throat  Respiratory: Positive for cough  Negative for shortness of breath  Cardiovascular: Positive for chest pain  Skin: Negative for rash  All other systems reviewed and are negative  Physical Exam  ED Triage Vitals [12/09/17 0345]   Temperature Pulse Respirations Blood Pressure SpO2   98 5 °F (36 9 °C) 85 19 136/70 97 %      Temp Source Heart Rate Source Patient Position - Orthostatic VS BP Location FiO2 (%)   Oral -- -- -- --      Pain Score       --           Orthostatic Vital Signs  Vitals:    12/09/17 0345   BP: 136/70   Pulse: 85       Physical Exam   Constitutional: She is oriented to person, place, and time  She appears well-developed and well-nourished  HENT:   Head: Normocephalic and atraumatic  Right Ear: External ear normal    Left Ear: External ear normal    Eyes: Conjunctivae and EOM are normal    Neck: No JVD present  No tracheal deviation present  No thyromegaly present  Cardiovascular: Normal rate  Pulmonary/Chest: Effort normal and breath sounds normal  No stridor  Abdominal: Soft  She exhibits no distension and no mass  There is no tenderness  There is no guarding  No hernia  Musculoskeletal: Normal range of motion   She exhibits no edema, tenderness or deformity  Lymphadenopathy:     She has no cervical adenopathy  Neurological: She is alert and oriented to person, place, and time  Skin: Skin is warm  No rash noted  No erythema  No pallor  Psychiatric: She has a normal mood and affect  Her behavior is normal    Nursing note and vitals reviewed  ED Medications  Medications - No data to display    Diagnostic Studies  Results Reviewed     None                 No orders to display              Procedures  Procedures       Phone Contacts  ED Phone Contact    ED Course  ED Course                                MDM  Number of Diagnoses or Management Options  Cough in adult: new and requires workup  URI, acute: new and requires workup     Amount and/or Complexity of Data Reviewed  Tests in the radiology section of CPT®: reviewed  Decide to obtain previous medical records or to obtain history from someone other than the patient: yes  Review and summarize past medical records: yes    Patient Progress  Patient progress: stable    CritCare Time    Disposition  Final diagnoses:   None     ED Disposition     None      Follow-up Information    None       Patient's Medications   Discharge Prescriptions    No medications on file     No discharge procedures on file      ED Provider  Electronically Signed by           Jessi Hogan DO  12/09/17 0303

## 2017-12-10 LAB
ATRIAL RATE: 75 BPM
P AXIS: 39 DEGREES
PR INTERVAL: 130 MS
QRS AXIS: 52 DEGREES
QRSD INTERVAL: 78 MS
QT INTERVAL: 388 MS
QTC INTERVAL: 433 MS
T WAVE AXIS: 41 DEGREES
VENTRICULAR RATE: 75 BPM

## 2018-01-06 ENCOUNTER — APPOINTMENT (EMERGENCY)
Dept: CT IMAGING | Facility: HOSPITAL | Age: 48
End: 2018-01-06
Payer: COMMERCIAL

## 2018-01-06 ENCOUNTER — HOSPITAL ENCOUNTER (EMERGENCY)
Facility: HOSPITAL | Age: 48
Discharge: HOME | End: 2018-01-06
Attending: EMERGENCY MEDICINE
Payer: COMMERCIAL

## 2018-01-06 VITALS
HEART RATE: 80 BPM | RESPIRATION RATE: 18 BRPM | HEIGHT: 64 IN | TEMPERATURE: 98.2 F | OXYGEN SATURATION: 99 % | DIASTOLIC BLOOD PRESSURE: 63 MMHG | WEIGHT: 169.09 LBS | SYSTOLIC BLOOD PRESSURE: 113 MMHG | BODY MASS INDEX: 28.87 KG/M2

## 2018-01-06 DIAGNOSIS — I88.0 MESENTERIC ADENITIS: ICD-10-CM

## 2018-01-06 DIAGNOSIS — R10.9 ABDOMINAL PAIN: Primary | ICD-10-CM

## 2018-01-06 DIAGNOSIS — R11.10 VOMITING: ICD-10-CM

## 2018-01-06 LAB
ALBUMIN SERPL BCP-MCNC: 3.5 G/DL (ref 3.5–5)
ALP SERPL-CCNC: 84 U/L (ref 46–116)
ALT SERPL W P-5'-P-CCNC: 22 U/L (ref 12–78)
AMPHETAMINES SERPL QL SCN: NEGATIVE
ANION GAP SERPL CALCULATED.3IONS-SCNC: 7 MMOL/L (ref 4–13)
AST SERPL W P-5'-P-CCNC: 19 U/L (ref 5–45)
BACTERIA UR QL AUTO: ABNORMAL /HPF
BARBITURATES UR QL: NEGATIVE
BASOPHILS # BLD AUTO: 0.03 THOUSANDS/ΜL (ref 0–0.1)
BASOPHILS NFR BLD AUTO: 0 % (ref 0–1)
BENZODIAZ UR QL: NEGATIVE
BILIRUB DIRECT SERPL-MCNC: 0.13 MG/DL (ref 0–0.2)
BILIRUB SERPL-MCNC: 0.3 MG/DL (ref 0.2–1)
BILIRUB UR QL STRIP: NEGATIVE
BUN SERPL-MCNC: 15 MG/DL (ref 5–25)
CALCIUM SERPL-MCNC: 8.9 MG/DL (ref 8.3–10.1)
CHLORIDE SERPL-SCNC: 105 MMOL/L (ref 100–108)
CLARITY UR: CLEAR
CO2 SERPL-SCNC: 28 MMOL/L (ref 21–32)
COCAINE UR QL: NEGATIVE
COLOR UR: YELLOW
CREAT SERPL-MCNC: 0.72 MG/DL (ref 0.6–1.3)
EOSINOPHIL # BLD AUTO: 0.13 THOUSAND/ΜL (ref 0–0.61)
EOSINOPHIL NFR BLD AUTO: 1 % (ref 0–6)
ERYTHROCYTE [DISTWIDTH] IN BLOOD BY AUTOMATED COUNT: 14.8 % (ref 11.6–15.1)
GFR SERPL CREATININE-BSD FRML MDRD: 115 ML/MIN/1.73SQ M
GLUCOSE SERPL-MCNC: 125 MG/DL (ref 65–140)
GLUCOSE UR STRIP-MCNC: NEGATIVE MG/DL
HCT VFR BLD AUTO: 35.7 % (ref 34.8–46.1)
HGB BLD-MCNC: 11.8 G/DL (ref 11.5–15.4)
HGB UR QL STRIP.AUTO: ABNORMAL
KETONES UR STRIP-MCNC: NEGATIVE MG/DL
LACTATE SERPL-SCNC: 1.4 MMOL/L (ref 0.5–2)
LEUKOCYTE ESTERASE UR QL STRIP: NEGATIVE
LIPASE SERPL-CCNC: 79 U/L (ref 73–393)
LYMPHOCYTES # BLD AUTO: 1.36 THOUSANDS/ΜL (ref 0.6–4.47)
LYMPHOCYTES NFR BLD AUTO: 14 % (ref 14–44)
MCH RBC QN AUTO: 28.1 PG (ref 26.8–34.3)
MCHC RBC AUTO-ENTMCNC: 33.1 G/DL (ref 31.4–37.4)
MCV RBC AUTO: 85 FL (ref 82–98)
METHADONE UR QL: NEGATIVE
MONOCYTES # BLD AUTO: 0.61 THOUSAND/ΜL (ref 0.17–1.22)
MONOCYTES NFR BLD AUTO: 6 % (ref 4–12)
NEUTROPHILS # BLD AUTO: 7.6 THOUSANDS/ΜL (ref 1.85–7.62)
NEUTS SEG NFR BLD AUTO: 78 % (ref 43–75)
NITRITE UR QL STRIP: NEGATIVE
NON-SQ EPI CELLS URNS QL MICRO: ABNORMAL /HPF
NRBC BLD AUTO-RTO: 0 /100 WBCS
OPIATES UR QL SCN: NEGATIVE
PCP UR QL: NEGATIVE
PH UR STRIP.AUTO: 6.5 [PH] (ref 4.5–8)
PLATELET # BLD AUTO: 222 THOUSANDS/UL (ref 149–390)
PMV BLD AUTO: 11.3 FL (ref 8.9–12.7)
POTASSIUM SERPL-SCNC: 3.8 MMOL/L (ref 3.5–5.3)
PROT SERPL-MCNC: 7.3 G/DL (ref 6.4–8.2)
PROT UR STRIP-MCNC: NEGATIVE MG/DL
RBC # BLD AUTO: 4.2 MILLION/UL (ref 3.81–5.12)
RBC #/AREA URNS AUTO: ABNORMAL /HPF
SODIUM SERPL-SCNC: 140 MMOL/L (ref 136–145)
SP GR UR STRIP.AUTO: <=1.005 (ref 1–1.03)
THC UR QL: NEGATIVE
UROBILINOGEN UR QL STRIP.AUTO: 0.2 E.U./DL
WBC # BLD AUTO: 9.76 THOUSAND/UL (ref 4.31–10.16)
WBC #/AREA URNS AUTO: ABNORMAL /HPF

## 2018-01-06 PROCEDURE — 36415 COLL VENOUS BLD VENIPUNCTURE: CPT | Performed by: EMERGENCY MEDICINE

## 2018-01-06 PROCEDURE — 80076 HEPATIC FUNCTION PANEL: CPT | Performed by: EMERGENCY MEDICINE

## 2018-01-06 PROCEDURE — 83605 ASSAY OF LACTIC ACID: CPT | Performed by: EMERGENCY MEDICINE

## 2018-01-06 PROCEDURE — 96375 TX/PRO/DX INJ NEW DRUG ADDON: CPT

## 2018-01-06 PROCEDURE — 96374 THER/PROPH/DIAG INJ IV PUSH: CPT

## 2018-01-06 PROCEDURE — 99284 EMERGENCY DEPT VISIT MOD MDM: CPT

## 2018-01-06 PROCEDURE — 80048 BASIC METABOLIC PNL TOTAL CA: CPT | Performed by: EMERGENCY MEDICINE

## 2018-01-06 PROCEDURE — 96376 TX/PRO/DX INJ SAME DRUG ADON: CPT

## 2018-01-06 PROCEDURE — 80307 DRUG TEST PRSMV CHEM ANLYZR: CPT | Performed by: EMERGENCY MEDICINE

## 2018-01-06 PROCEDURE — 96361 HYDRATE IV INFUSION ADD-ON: CPT

## 2018-01-06 PROCEDURE — 85025 COMPLETE CBC W/AUTO DIFF WBC: CPT | Performed by: EMERGENCY MEDICINE

## 2018-01-06 PROCEDURE — 81025 URINE PREGNANCY TEST: CPT | Performed by: EMERGENCY MEDICINE

## 2018-01-06 PROCEDURE — 74177 CT ABD & PELVIS W/CONTRAST: CPT

## 2018-01-06 PROCEDURE — 81001 URINALYSIS AUTO W/SCOPE: CPT | Performed by: EMERGENCY MEDICINE

## 2018-01-06 PROCEDURE — 83690 ASSAY OF LIPASE: CPT | Performed by: EMERGENCY MEDICINE

## 2018-01-06 RX ORDER — ONDANSETRON 2 MG/ML
4 INJECTION INTRAMUSCULAR; INTRAVENOUS ONCE
Status: DISCONTINUED | OUTPATIENT
Start: 2018-01-06 | End: 2018-01-06 | Stop reason: HOSPADM

## 2018-01-06 RX ORDER — KETOROLAC TROMETHAMINE 30 MG/ML
15 INJECTION, SOLUTION INTRAMUSCULAR; INTRAVENOUS ONCE
Status: COMPLETED | OUTPATIENT
Start: 2018-01-06 | End: 2018-01-06

## 2018-01-06 RX ORDER — VALACYCLOVIR HYDROCHLORIDE 500 MG/1
500 TABLET, FILM COATED ORAL 2 TIMES DAILY
Qty: 6 TABLET | Refills: 0 | Status: SHIPPED | OUTPATIENT
Start: 2018-01-06 | End: 2020-05-27

## 2018-01-06 RX ORDER — ONDANSETRON 2 MG/ML
4 INJECTION INTRAMUSCULAR; INTRAVENOUS ONCE
Status: COMPLETED | OUTPATIENT
Start: 2018-01-06 | End: 2018-01-06

## 2018-01-06 RX ORDER — ONDANSETRON 4 MG/1
4 TABLET, FILM COATED ORAL EVERY 6 HOURS
Qty: 12 TABLET | Refills: 0 | Status: ON HOLD | OUTPATIENT
Start: 2018-01-06 | End: 2020-05-27

## 2018-01-06 RX ORDER — DICYCLOMINE HCL 20 MG
TABLET ORAL
Qty: 12 TABLET | Refills: 0 | Status: SHIPPED | OUTPATIENT
Start: 2018-01-06

## 2018-01-06 RX ADMIN — ONDANSETRON 4 MG: 2 INJECTION INTRAMUSCULAR; INTRAVENOUS at 17:12

## 2018-01-06 RX ADMIN — SODIUM CHLORIDE 1000 ML: 0.9 INJECTION, SOLUTION INTRAVENOUS at 17:14

## 2018-01-06 RX ADMIN — SODIUM CHLORIDE 1000 ML: 0.9 INJECTION, SOLUTION INTRAVENOUS at 12:38

## 2018-01-06 RX ADMIN — IOHEXOL 100 ML: 350 INJECTION, SOLUTION INTRAVENOUS at 13:42

## 2018-01-06 RX ADMIN — KETOROLAC TROMETHAMINE 15 MG: 30 INJECTION, SOLUTION INTRAMUSCULAR at 17:13

## 2018-01-06 RX ADMIN — KETOROLAC TROMETHAMINE 15 MG: 30 INJECTION, SOLUTION INTRAMUSCULAR at 16:37

## 2018-01-06 RX ADMIN — SODIUM CHLORIDE 1000 ML: 0.9 INJECTION, SOLUTION INTRAVENOUS at 15:15

## 2018-01-06 NOTE — DISCHARGE INSTRUCTIONS
1   Your abdominal pain but can be caused by several things  The CT scan is uncertain if there is any acute appendicitis versus a viral inflammation of your lymph glands such as mesenteric adenitis  If your pain is getting worse instead of better, you need to return to the emergency department  A prescription for an outpatient CT scan with oral and IV contrast is written for you to have within the next day or two if no improvement  You can call to schedule this appointment  You also have been provided with a surgeon to follow up with and I would recommend calling his office on Monday  2   Take Zofran every 6 hours for nausea or vomiting  3  Take Bentyl 20 milligrams every 6 hours for abdominal pain/cramping  4  Encourage liquids and rest   Do not eat any heavy meals if you're not tolerating liquids  5   Return if any problems  Six  Your herpes medication is being change from acyclovir to the Valacyclovir  Take 500 milligrams twice a day but eat something prior to taking the medicine  Abdominal Pain, Ambulatory Care   GENERAL INFORMATION:   Abdominal pain  can be dull, achy, or sharp  You may have pain in one area of your abdomen, or in your entire abdomen  Your pain may be caused by constipation, food sensitivity or poisoning, infection, or a blockage  Abdominal pain can also be caused by a hernia, appendicitis, or an ulcer  The cause of your abdominal pain may be unknown     Seek immediate care for the following symptoms:   · New chest pain or shortness of breath    · Pulsing pain in your upper abdomen or lower back that suddenly becomes constant    · Pain in the right lower abdominal area that worsens with movement    · Fever over 100 4°F (38°C) or shaking chills    · Vomiting and you cannot keep food or fluids down    · Pain does not improve or gets worse over the next 8 to 12 hours    · Blood in your vomit or bowel movements, or they look black and tarry    · Skin or the whites of your eyes turn yellow    · Large amount of vaginal bleeding that is not your monthly period  Treatment for abdominal pain  may include medicine to calm your stomach, prevent vomiting, or decrease pain  Follow up with your healthcare provider as directed:  Write down your questions so you remember to ask them during your visits  CARE AGREEMENT:   You have the right to help plan your care  Learn about your health condition and how it may be treated  Discuss treatment options with your caregivers to decide what care you want to receive  You always have the right to refuse treatment  The above information is an  only  It is not intended as medical advice for individual conditions or treatments  Talk to your doctor, nurse or pharmacist before following any medical regimen to see if it is safe and effective for you  © 2014 [address] is for End User's use only and may not be sold, redistributed or otherwise used for commercial purposes  All illustrations and images included in CareNotes® are the copyrighted property of A D [employer]  or [employer]  Acute Nausea and Vomiting, Ambulatory Care   GENERAL INFORMATION:   Acute nausea and vomiting  starts suddenly, gets worse quickly, and lasts a short time  Nausea and vomiting may be caused by pregnancy, alcohol, infection, or medicines    Common related symptoms include the following:   · Fever    · Abdominal swelling    · Pain, tenderness, or a lump in the abdomen    · Splashing sounds heard in your stomach when you move  Seek immediate care for the following symptoms:   · Blood in your vomit or bowel movements    · Sudden, severe pain in your chest and upper abdomen after hard vomiting    · Dizziness, dry mouth, and thirst    · Urinating very little or not at all    · Muscle weakness, leg cramps, and trouble breathing    · A heart beat that is faster than normal    · Vomiting for more than 48 hours  Treatment for acute nausea and vomiting  may include medicines to calm your stomach and stop the vomiting  You may need IV fluids if you are dehydrated  Manage your nausea and vomiting:   · Drink liquids as directed to prevent dehydration  Ask how much liquid to drink each day and which liquids are best for you  You may need to drink an oral rehydration solution (ORS)  ORS contains water, salts, and sugar that are needed to replace the lost body fluids  Ask what kind of ORS to use, how much to drink, and where to get it  · Eat smaller meals, more often  Eat small amounts of food every 2 to 3 hours, even if you are not hungry  Food in your stomach may help decrease your nausea  · Avoid stress  Find ways to relax and manage your stress  Headaches due to stress may cause nausea and vomiting  Get more rest and sleep  Follow up with your healthcare provider as directed:  Write down your questions so you remember to ask them during your visits  CARE AGREEMENT:   You have the right to help plan your care  Learn about your health condition and how it may be treated  Discuss treatment options with your caregivers to decide what care you want to receive  You always have the right to refuse treatment  The above information is an  only  It is not intended as medical advice for individual conditions or treatments  Talk to your doctor, nurse or pharmacist before following any medical regimen to see if it is safe and effective for you  © 2014 [address] is for End User's use only and may not be sold, redistributed or otherwise used for commercial purposes  All illustrations and images included in CareNotes® are the copyrighted property of [employer]  or [employer]  Mesenteric Adenitis   WHAT YOU NEED TO KNOW:   Mesenteric adenitis is inflammation of lymph nodes in the tissue that surrounds your intestines   Lymph nodes are organs of the immune system that help absorb bacteria and toxins from your body  It is caused by infection from bacteria, viruses, or parasites  Mesenteric adenitis may cause dehydration and loss of electrolytes (minerals), such as sodium  Rarely, it could lead to sepsis (a serious blood infection) or an abscess (pus-filled wound) on your intestine  DISCHARGE INSTRUCTIONS:   Follow up with your healthcare provider as directed:  Write down your questions so you remember to ask them during your visits  Prevent mesenteric adenitis:   · Wash your hands  Use soap and water  Wash your hands after you use the bathroom, change a child's diapers, or sneeze  Wash your hands before you prepare or eat food  · Cook meats all the way through  Use a meat thermometer to make sure meat is heated to a temperature that will kill bacteria  Do not eat raw or undercooked chicken, turkey, seafood, beef, or pork  · Drink safe water  Drink only treated water  Do not drink water from ponds or lakes  · Drink safe milk  Drink only pasteurized milk  Do not drink raw milk  Contact your healthcare provider if:   · Your symptoms return  · You have questions or concerns about your condition or care  Return to the emergency department if:   · You pass very little urine  · You cannot pass a bowel movement or gas  · You are extremely thirsty  · You become pale, exhausted, or sweaty without effort  · You have swelling in your abdomen  © 2017 [address] Information is for End User's use only and may not be sold, redistributed or otherwise used for commercial purposes  All illustrations and images included in CareNotes® are the copyrighted property of A [employer]  or [employer]  The above information is an  only  It is not intended as medical advice for individual conditions or treatments   Talk to your doctor, nurse or pharmacist before following any medical regimen to see if it is safe and effective for you       Appendicitis   WHAT YOU NEED TO KNOW:   What is appendicitis? Appendicitis is inflammation of your appendix  The appendix is a small pouch  It is attached to the large intestine on the lower right side of the abdomen  The appendix may get blocked by food or by part of a bowel movement that becomes hard  The appendix can become infected with bacteria or a virus  Appendicitis can also be caused by a parasite or tumor  You will need immediate care to prevent a ruptured appendix  A ruptured appendix can cause bacteria to leak into the abdomen  This can lead to a serious infection called peritonitis  What are the signs and symptoms of appendicitis? Symptoms may start suddenly  The most common symptom is pain that starts at the belly button and moves to the lower right side of your abdomen  The pain worsens when you touch your abdomen, move, sneeze, cough, or take a deep breath  You may also have any of the following:  · Abdomen that feels hard    · Diarrhea or constipation    · Loss of appetite     · Nausea or vomiting     · Fever  How is appendicitis diagnosed? Your healthcare provider will examine you and check for pain or tenderness in your abdomen  You may also need any of the following:  · Blood tests  may show if you have an infection  · A urine test  may be used to check for a urinary tract infection or kidney stone  · CT or ultrasound  pictures of your abdomen may be used to check your appendix  You may be given contrast liquid to help your appendix show up better in the pictures  Tell the healthcare provider if you have ever had an allergic reaction to contrast liquid  How is appendicitis treated? · Medicines  may be given to fight an infection or to manage pain  Ask your healthcare provider how to take pain medicine safely  · Drainage  may be needed if you develop an abscess after a burst appendix   To drain the abscess, your healthcare provider guides a tube through your skin and into the abscess  Infected fluid drains through the tube  · An appendectomy  is surgery to remove your appendix  Your appendix may be removed through small incisions in your abdomen  If your appendix has burst, you may need an open appendectomy  A single, larger incision is made to remove the appendix and clean out the abdomen  When should I seek immediate care? · You have a fever  · You have severe pain in your abdomen  · You are vomiting and cannot keep food down  When should I contact my healthcare provider? · You have abdominal pain that does not go away, even after you take medicine  · You have chills, a cough, or feel weak and achy  · You have trouble having a bowel movement, or you have diarrhea  · You have questions or concerns about your condition or care  CARE AGREEMENT:   You have the right to help plan your care  Learn about your health condition and how it may be treated  Discuss treatment options with your caregivers to decide what care you want to receive  You always have the right to refuse treatment  The above information is an  only  It is not intended as medical advice for individual conditions or treatments  Talk to your doctor, nurse or pharmacist before following any medical regimen to see if it is safe and effective for you  © 2017 [facility] Information is for End User's use only and may not be sold, redistributed or otherwise used for commercial purposes  All illustrations and images included in CareNotes® are the copyrighted property of A D A M [employer]  or [employer]

## 2018-01-06 NOTE — ED NOTES
When RN entered room to draw blood, patient was found eating dorritos  Patient stating "I am in so much pain"  Ensured we are doing everything we can to make her comfortable        Kalie Diaz RN  01/06/18 3782

## 2018-01-06 NOTE — ED PROVIDER NOTES
History  Chief Complaint   Patient presents with    Abdominal Pain     Pt began with abdominal pain in the lower left and right at 6am this morning, vomited 3x  Pt has not had anything to eat yesterday or today  Pt began taking Valcyclovir for herpes yesterday  HPI  59-year-old [facility] American female with a chief complaint of lower left and right abdominal pain which started approximately 6 a m  this morning  Patient states that she vomited x3 today  Patient states that she was not able to eat anything yesterday or today  Patient recently began taking valacyclovir for her herpes yesterday  Patient also lost her 8year-old granddaughter 3 days ago due to asthma   states that patient was out partying all night however patient denies this and states she was at [employer]  Prior to Admission Medications   Prescriptions Last Dose Informant Patient Reported? Taking? albuterol (PROVENTIL HFA,VENTOLIN HFA) 90 mcg/act inhaler   No Yes   Sig: Inhale 2 puffs every 4 (four) hours as needed for wheezing   benzocaine (HURRICAINE) 20 %   No Yes   Si application by Mucosal route 3 (three) times a day as needed for mucositis   benzonatate (TESSALON PERLES) 100 mg capsule   No Yes   Sig: Take 1 capsule by mouth every 8 (eight) hours   ibuprofen (MOTRIN) 600 mg tablet   No Yes   Sig: Take 1 tablet by mouth every 6 (six) hours as needed for mild pain      Facility-Administered Medications: None       History reviewed  No pertinent past medical history  Past Surgical History:   Procedure Laterality Date    HYSTERECTOMY         History reviewed  No pertinent family history  I have reviewed and agree with the history as documented  Social History   Substance Use Topics    Smoking status: Current Every Day Smoker     Packs/day: 0 20    Smokeless tobacco: Never Used    Alcohol use No        Review of Systems   Constitutional: Negative for chills and fever  HENT: Negative for congestion and rhinorrhea  Eyes: Negative for discharge and visual disturbance  Respiratory: Negative for shortness of breath and wheezing  Cardiovascular: Negative for chest pain and palpitations  Gastrointestinal: Positive for abdominal pain, nausea and vomiting  Endocrine: Negative for polydipsia and polyuria  Genitourinary: Negative for dysuria and hematuria  Musculoskeletal: Negative for arthralgias, gait problem and neck stiffness  Skin: Negative for rash and wound  Neurological: Negative for dizziness and headaches  Psychiatric/Behavioral: Negative for confusion and suicidal ideas  Physical Exam  ED Triage Vitals [01/06/18 1126]   Temperature Pulse Respirations Blood Pressure SpO2   97 9 °F (36 6 °C) 72 18 120/80 98 %      Temp Source Heart Rate Source Patient Position - Orthostatic VS BP Location FiO2 (%)   Oral Monitor Lying Right arm --      Pain Score       Worst Possible Pain           Orthostatic Vital Signs  Vitals:    01/06/18 1431 01/06/18 1500 01/06/18 1730 01/06/18 1800   BP: 120/62 121/71 140/75 113/63   Pulse: 80  85 80   Patient Position - Orthostatic VS: Lying          Physical Exam   Constitutional: She is oriented to person, place, and time  She appears well-developed and well-nourished  54-year-old [facility] American female in some distress with nausea and abdominal pain   HENT:   Head: Normocephalic and atraumatic  Mouth/Throat: Oropharynx is clear and moist    Eyes: EOM are normal  Pupils are equal, round, and reactive to light  Neck: Normal range of motion  Neck supple  Cardiovascular: Normal rate, regular rhythm and normal heart sounds  Pulmonary/Chest: Effort normal  No respiratory distress  She has no wheezes  She has no rales  Abdominal: Soft  Bowel sounds are normal  There is tenderness (Positive mild tenderness to suprapubic and left low quadrant and right lower quadrant on palpation  Bowel sounds are present  )  There is no rebound and no guarding     Musculoskeletal: Normal range of motion  Neurological: She is alert and oriented to person, place, and time  No cranial nerve deficit  She exhibits normal muscle tone  Coordination normal    Skin: Skin is warm and dry  Psychiatric: She has a normal mood and affect  Nursing note and vitals reviewed  ED Medications  Medications   ondansetron (ZOFRAN) injection 4 mg (4 mg Intravenous Not Given 1/6/18 1237)   sodium chloride 0 9 % bolus 1,000 mL (not administered)   sodium chloride 0 9 % bolus 1,000 mL (0 mL Intravenous Stopped 1/6/18 1338)   iohexol (OMNIPAQUE) 350 MG/ML injection (MULTI-DOSE) 100 mL (100 mL Intravenous Given 1/6/18 1342)   sodium chloride 0 9 % bolus 1,000 mL (0 mL Intravenous Stopped 1/6/18 1814)   sodium chloride 0 9 % bolus 1,000 mL (0 mL Intravenous Stopped 1/6/18 1615)   ketorolac (TORADOL) injection 15 mg (15 mg Intravenous Given 1/6/18 1637)   ondansetron (ZOFRAN) injection 4 mg (4 mg Intravenous Given 1/6/18 1712)   ketorolac (TORADOL) injection 15 mg (15 mg Intravenous Given 1/6/18 1713)       Diagnostic Studies  Results Reviewed     Procedure Component Value Units Date/Time    Urine Microscopic [[id number]]  (Abnormal) Collected:  01/06/18 1414    Lab Status:  Final result Specimen:  Urine from Urine, Clean Catch Updated:  01/06/18 1451     RBC, UA 1-2 (A) /hpf      WBC, UA None Seen /hpf      Epithelial Cells Occasional /hpf      Bacteria, UA Occasional /hpf     Rapid drug screen, urine [[id number]]  (Normal) Collected:  01/06/18 1414    Lab Status:  Final result Specimen:  Urine from Urine, Clean Catch Updated:  01/06/18 1447     Amph/Meth UR Negative     Barbiturate Ur Negative     Benzodiazepine Urine Negative     Cocaine Urine Negative     Methadone Urine Negative     Opiate Urine Negative     PCP Ur Negative     THC Urine Negative    Narrative:         FOR MEDICAL PURPOSES ONLY  IF CONFIRMATION NEEDED PLEASE CONTACT THE LAB WITHIN 5 DAYS      Drug Screen Cutoff Levels:  AMPHETAMINE/METHAMPHETAMINES  1000 ng/mL  BARBITURATES     200 ng/mL  BENZODIAZEPINES     200 ng/mL  COCAINE      300 ng/mL  METHADONE      300 ng/mL  OPIATES      300 ng/mL  PHENCYCLIDINE     25 ng/mL  THC       50 ng/mL    UA w Reflex to Microscopic w Reflex to Culture [[id number]]  (Abnormal) Collected:  01/06/18 1414    Lab Status:  Final result Specimen:  Urine from Urine, Clean Catch Updated:  01/06/18 1442     Color, UA Yellow     Clarity, UA Clear     Specific Gravity, UA <=1 005     pH, UA 6 5     Leukocytes, UA Negative     Nitrite, UA Negative     Protein, UA Negative mg/dl      Glucose, UA Negative mg/dl      Ketones, UA Negative mg/dl      Urobilinogen, UA 0 2 E U /dl      Bilirubin, UA Negative     Blood, UA Trace-lysed (A)    Hepatic function panel [[id number]]  (Normal) Collected:  01/06/18 1259    Lab Status:  Final result Specimen:  Blood from Arm, Left Updated:  01/06/18 1330     Total Bilirubin 0 30 mg/dL      Bilirubin, Direct 0 13 mg/dL      Alkaline Phosphatase 84 U/L      AST 19 U/L      ALT 22 U/L      Total Protein 7 3 g/dL      Albumin 3 5 g/dL     Lactic acid, plasma [[id number]]  (Normal) Collected:  01/06/18 1259    Lab Status:  Final result Specimen:  Blood from Arm, Left Updated:  01/06/18 1330     LACTIC ACID 1 4 mmol/L     Narrative:         Result may be elevated if tourniquet was used during collection      Basic metabolic panel [[id number]] Collected:  01/06/18 1259    Lab Status:  Final result Specimen:  Blood from Arm, Left Updated:  01/06/18 1329     Sodium 140 mmol/L      Potassium 3 8 mmol/L      Chloride 105 mmol/L      CO2 28 mmol/L      Anion Gap 7 mmol/L      BUN 15 mg/dL      Creatinine 0 72 mg/dL      Glucose 125 mg/dL      Calcium 8 9 mg/dL      eGFR 115 ml/min/1 73sq m     Narrative:         National Kidney Disease Education Program recommendations are as follows:  GFR calculation is accurate only with a steady state creatinine  Chronic Kidney disease less than 60 ml/min/1 73 sq  meters  Kidney failure less than 15 ml/min/1 73 sq  meters  Lipase [[id number]]  (Normal) Collected:  01/06/18 1259    Lab Status:  Final result Specimen:  Blood from Arm, Left Updated:  01/06/18 1329     Lipase 79 u/L     CBC and differential [[id number]]  (Abnormal) Collected:  01/06/18 1232    Lab Status:  Final result Specimen:  Blood from Arm, Left Updated:  01/06/18 1241     WBC 9 76 Thousand/uL      RBC 4 20 Million/uL      Hemoglobin 11 8 g/dL      Hematocrit 35 7 %      MCV 85 fL      MCH 28 1 pg      MCHC 33 1 g/dL      RDW 14 8 %      MPV 11 3 fL      Platelets 629 Thousands/uL      nRBC 0 /100 WBCs      Neutrophils Relative 78 (H) %      Lymphocytes Relative 14 %      Monocytes Relative 6 %      Eosinophils Relative 1 %      Basophils Relative 0 %      Neutrophils Absolute 7 60 Thousands/µL      Lymphocytes Absolute 1 36 Thousands/µL      Monocytes Absolute 0 61 Thousand/µL      Eosinophils Absolute 0 13 Thousand/µL      Basophils Absolute 0 03 Thousands/µL     POCT pregnancy, urine [[id number]]  (Normal) Resulted:  01/06/18 1207    Lab Status:  Edited Result - FINAL Updated:  01/06/18 1208                 CT abdomen pelvis with contrast   Final Result by Chandler Mariee MD (01/06 1406)         1  Top normal appendix containing a few foci of gas, measuring 6 mm in diameter  However there are mild adjacent periappendiceal stranding/inflammatory changes without collection  Early acute appendicitis is difficult to exclude entirely  Differential diagnosis could include mesenteric adenitis as there are a few lymph nodes in this region as well  Consider repeat CT of the  pelvis in 12-24 hours with oral contrast for further characterization  Further clinical evaluation advised           Workstation performed: [id number]         CT abdomen pelvis w contrast    (Results Pending)              Procedures  Procedures       Phone Contacts  ED Phone Contact    ED Course  ED Course     Re-examined patient multiple times  Patient was resting comfortably on her side  Abdomen remains soft  Patient has mild tenderness suprapubic left lower quadrant and right lower quadrant but very mild when distracted does not complain of any pain  5:10 p m  I re-examined patient  Patient states she just vomited right after the pain medication  I offered patient admission by surgery to watch overnight and patient does not wish to be admitted  We will try some more Zofran and Toradol at this time  Patient is also requesting a work note for her  who has been here since this morning with the patient  6:20 p m  patient is requesting food  I explained to the nurse to give patient a fluid challenge since she recently vomited  Nurse states that patient is adamant about eating  I am going to write the patient for discharge and place her on some Zofran and change her acyclovir to Famvir and give her an outpatient CT with oral contrast if her pain continues to rule out acute appendicitis  6:55 p m  re-examined patient again  Patient is walking around the room and eating her sandwich  Patient states she is feeling much better wants to go home; again I reiterated that if her abdominal pain got worse or her vomiting got worse to return to the emergency department  I also told her that she could have a possible appendicitis and rule her to have a CT scan in the next 24 hours if the pain was increasing  Patient understood and agreed with the plan  MDM  The patient presented with a condition in which there was a high probability of imminent or life-threatening deterioration, and critical care services (excluding separately billable procedures) totalled 30-74 minutes  Differential diagnosis includes:  1  Abdominal pain  2  Vomiting  3  Gastroenteritis  4  UTI  5    Rule out intra-abdominal pathology    Disposition  Final diagnoses:   Abdominal pain   Mesenteric adenitis - vs appendicitis Vomiting - Medication effect     Time reflects when diagnosis was documented in both MDM as applicable and the Disposition within this note     Time User Action Codes Description Comment    1/6/2018  6:24 PM Soundra Randolph Add [R10 9] Abdominal pain     1/6/2018  6:25 PM Roger ALVAREZ Add [I88 0] Mesenteric adenitis     1/6/2018  6:25 PM Soundra Randolph Add [R11 10] Vomiting     1/6/2018  6:25 PM Soundra Randolph Modify [R11 10] Vomiting Medication effect    1/6/2018  6:34 PM Soundra Randolph Modify [I88 0] Mesenteric adenitis vs appendicitis      ED Disposition     ED Disposition Condition Comment    Discharge  [facility] discharge to home/self care  Condition at discharge: Good        Follow-up Information     Follow up With Specialties Details Why Kiah Hunter MD General Surgery In 2 days  [address]  [address]  [phone]          Patient's Medications   Discharge Prescriptions    DICYCLOMINE (BENTYL) 20 MG TABLET    Take 1 tablet every 6 hours for abdominal cramping       Start Date: 1/6/2018  End Date: --       Order Dose: --       Quantity: 12 tablet    Refills: 0    ONDANSETRON (ZOFRAN) 4 MG TABLET    Take 1 tablet by mouth every 6 (six) hours       Start Date: 1/6/2018  End Date: --       Order Dose: 4 mg       Quantity: 12 tablet    Refills: 0    VALACYCLOVIR (VALTREX) 500 MG TABLET    Take 1 tablet by mouth 2 (two) times a day for 3 days       Start Date: 1/6/2018  End Date: 1/9/2018       Order Dose: 500 mg       Quantity: 6 tablet    Refills: 0       Outpatient Discharge Orders  CT abdomen pelvis w contrast   Standing Status: Future  Standing Exp   Date: 01/06/22         ED Provider  Electronically Signed by           Kiah Crespo DO  01/06/18 9622

## 2018-01-06 NOTE — ED NOTES
Upon entry to redraw blood, pt noted to be eating doritos and stating that shes "starving " Pt informed that since she is here for abdominal pain she should not be eating        Dale Vargas RN  01/06/18 0622

## 2018-01-06 NOTE — ED NOTES
Patient's pregnancy test has not yet been completed, ER [facility] Stuart Ramon placed results from another patient onto this patient's chart accidentally  Results have been cleared from this chart        Anthony Church  01/06/18 1536

## 2018-03-27 ENCOUNTER — APPOINTMENT (EMERGENCY)
Dept: CT IMAGING | Facility: HOSPITAL | Age: 48
End: 2018-03-27
Payer: COMMERCIAL

## 2018-03-27 ENCOUNTER — HOSPITAL ENCOUNTER (EMERGENCY)
Facility: HOSPITAL | Age: 48
Discharge: HOME | End: 2018-03-27
Admitting: EMERGENCY MEDICINE
Payer: COMMERCIAL

## 2018-03-27 ENCOUNTER — APPOINTMENT (EMERGENCY)
Dept: RADIOLOGY | Facility: HOSPITAL | Age: 48
End: 2018-03-27
Payer: COMMERCIAL

## 2018-03-27 VITALS
SYSTOLIC BLOOD PRESSURE: 111 MMHG | OXYGEN SATURATION: 100 % | BODY MASS INDEX: 28.17 KG/M2 | DIASTOLIC BLOOD PRESSURE: 68 MMHG | WEIGHT: 165 LBS | HEART RATE: 71 BPM | RESPIRATION RATE: 18 BRPM | HEIGHT: 64 IN | TEMPERATURE: 97.7 F

## 2018-03-27 DIAGNOSIS — S09.90XA ACUTE HEAD INJURY WITHOUT LOSS OF CONSCIOUSNESS: Primary | ICD-10-CM
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
## 2018-03-27 DIAGNOSIS — S39.92XA TAILBONE INJURY: ICD-10-CM

## 2018-03-27 PROCEDURE — 72125 CT NECK SPINE W/O DYE: CPT

## 2018-03-27 PROCEDURE — 72220 X-RAY EXAM SACRUM TAILBONE: CPT

## 2018-03-27 PROCEDURE — 70450 CT HEAD/BRAIN W/O DYE: CPT

## 2018-03-27 PROCEDURE — 99284 EMERGENCY DEPT VISIT MOD MDM: CPT

## 2018-03-27 RX ORDER — CYCLOBENZAPRINE HCL 10 MG
10 TABLET ORAL 3 TIMES DAILY PRN
Qty: 15 TABLET | Refills: 0 | Status: ON HOLD | OUTPATIENT
Start: 2018-03-27 | End: 2020-05-27

## 2018-03-27 RX ORDER — ACETAMINOPHEN 325 MG/1
650 TABLET ORAL ONCE
Status: COMPLETED | OUTPATIENT
Start: 2018-03-27 | End: 2018-03-27

## 2018-03-27 RX ADMIN — ACETAMINOPHEN 650 MG: 325 TABLET, FILM COATED ORAL at 14:28

## 2018-03-27 NOTE — ED PROVIDER NOTES
History  Chief Complaint   Patient presents with    Fall     Patient states she fell this morning outside her home, now c/o headache, and lower back pain  Patient denies any LOC post fall, denies vomiting, denies thinners  This is a 69-year-old female who is here for a fall that occurred earlier this morning while walking to her car  She states that an icicle above her porch is melting and refreezing on the ground  She did not realize there was a patch of ice and she slipped, striking the occiput of her head on a stair and then fell onto her buttocks and braced her fall with her hands  She was able to stop her fall and did not tumble down the steps  Denies LOC  No has headache, right sided neck pain, and pain around her tailbone  Otherwise no complaints  Denies chest pain, sob, dizziness, visual disturbances, abdominal pain  No urinary incontinence, saddle anesthesia, leg weakness  No prior injuries to the currently affected areas  Denies anticoagulants or antiplatelets           History provided by:  Patient   used: No    Fall   Mechanism of injury: fall    Injury location:  Head/neck (Buttocks)  Head/neck injury location:  R neck  Incident location:  Home  Time since incident:  6 hours  Arrived directly from scene: no    Fall:     Fall occurred:  Down stairs (Slipped on ice)    Impact surface:  [place]    Point of impact:  Head, outstretched arms and buttocks    Entrapped after fall: no    Protective equipment: none    Suspicion of alcohol use: no    Suspicion of drug use: no    Tetanus status:  Unknown  Prior to arrival data:     Bystander interventions:  None  Associated symptoms: no abdominal pain, no back pain, no blindness, no chest pain, no difficulty breathing, no headaches, no hearing loss, no loss of consciousness, no nausea, no neck pain, no seizures and no vomiting    Risk factors: no AICD, no anticoagulation therapy, no asthma, no beta blocker therapy, no CABG, no CAD, no CHF, no COPD, no diabetes, no dialysis, no hemophilia, no kidney disease, no pacemaker, no past MI, not pregnant and no steroid use        Prior to Admission Medications   Prescriptions Last Dose Informant Patient Reported? Taking? albuterol (PROVENTIL HFA,VENTOLIN HFA) 90 mcg/act inhaler   No No   Sig: Inhale 2 puffs every 4 (four) hours as needed for wheezing   benzocaine (HURRICAINE) 20 %   No No   Si application by Mucosal route 3 (three) times a day as needed for mucositis   benzonatate (TESSALON PERLES) 100 mg capsule   No No   Sig: Take 1 capsule by mouth every 8 (eight) hours   dicyclomine (BENTYL) 20 mg tablet   No No   Sig: Take 1 tablet every 6 hours for abdominal cramping   ibuprofen (MOTRIN) 600 mg tablet   No No   Sig: Take 1 tablet by mouth every 6 (six) hours as needed for mild pain   ondansetron (ZOFRAN) 4 mg tablet   No No   Sig: Take 1 tablet by mouth every 6 (six) hours   valACYclovir (VALTREX) 500 mg tablet   No No   Sig: Take 1 tablet by mouth 2 (two) times a day for 3 days      Facility-Administered Medications: None       History reviewed  No pertinent past medical history  Past Surgical History:   Procedure Laterality Date    HYSTERECTOMY         History reviewed  No pertinent family history  I have reviewed and agree with the history as documented  Social History   Substance Use Topics    Smoking status: Current Every Day Smoker     Packs/day: 0 20    Smokeless tobacco: Never Used    Alcohol use No        Review of Systems   Constitutional: Negative  HENT: Negative for dental problem, ear pain, hearing loss, nosebleeds, tinnitus and trouble swallowing  Eyes: Negative for blindness, photophobia, pain and visual disturbance  Respiratory: Negative for apnea, cough, choking, chest tightness, shortness of breath, wheezing and stridor  Cardiovascular: Negative for chest pain  Gastrointestinal: Negative for abdominal pain, nausea and vomiting     Genitourinary: Negative for decreased urine volume and enuresis  Musculoskeletal: Negative for back pain, myalgias, neck pain and neck stiffness  Tailbone as well as right-sided neck pain   Skin: Negative for pallor, rash and wound  Allergic/Immunologic: Negative  Neurological: Negative for dizziness, seizures, loss of consciousness, syncope, speech difficulty, weakness, light-headedness, numbness and headaches  All other systems reviewed and are negative  Physical Exam  ED Triage Vitals   Temperature Pulse Respirations Blood Pressure SpO2   03/27/18 1323 03/27/18 1323 03/27/18 1323 03/27/18 1323 03/27/18 1323   97 7 °F (36 5 °C) 71 18 111/68 100 %      Temp Source Heart Rate Source Patient Position - Orthostatic VS BP Location FiO2 (%)   03/27/18 1323 03/27/18 1323 03/27/18 1323 03/27/18 1323 --   Oral Monitor Sitting Left arm       Pain Score       03/27/18 1428       Worst Possible Pain           Orthostatic Vital Signs  Vitals:    03/27/18 1323   BP: 111/68   Pulse: 71   Patient Position - Orthostatic VS: Sitting       Physical Exam   Constitutional: She is oriented to person, place, and time  She appears well-developed and well-nourished  Non-toxic appearance  She does not have a sickly appearance  She does not appear ill  No distress  HENT:   Head: Normocephalic and atraumatic  Eyes: EOM and lids are normal  Pupils are equal, round, and reactive to light  Neck: Trachea normal  Neck supple  Muscular tenderness present  No spinous process tenderness present  No neck rigidity  Decreased range of motion (2/2 pain) present  No tracheal deviation, no edema and no erythema present  Cardiovascular: Normal rate, regular rhythm, S1 normal, S2 normal, normal heart sounds, intact distal pulses and normal pulses  Exam reveals no gallop, no distant heart sounds, no friction rub and no decreased pulses  No murmur heard  Pulses:       Radial pulses are 2+ on the right side, and 2+ on the left side  Pulmonary/Chest: Effort normal and breath sounds normal  No accessory muscle usage or stridor  No apnea, no tachypnea and no bradypnea  No respiratory distress  She has no decreased breath sounds  She has no wheezes  She has no rhonchi  She has no rales  Abdominal: Soft  Normal appearance and bowel sounds are normal  She exhibits no distension and no mass  There is no tenderness  There is no rigidity, no rebound and no guarding  No hernia  Musculoskeletal: She exhibits no edema or deformity  Cervical back: She exhibits tenderness, pain and spasm  She exhibits normal range of motion, no bony tenderness, no swelling, no edema, no deformity and no laceration  Thoracic back: Normal         Lumbar back: She exhibits tenderness, bony tenderness and pain  She exhibits normal range of motion, no swelling, no edema, no deformity, no laceration and no spasm  Back:    Neurological: She is alert and oriented to person, place, and time  No cranial nerve deficit  GCS eye subscore is 4  GCS verbal subscore is 5  GCS motor subscore is 6  GCS 15  AAOx3  No focal neuro deficits  CN II-XII grossly intact  Speech normal, no aphasia or dysarthria  No pronator drift  Cerebellar function normal  Finger to nose normal  PERRL  EOMI  Peripheral vision intact  No nystagmus  Upper and lower extremity strength 5/5 through   strength 5/5 b/l  Gross sensation intact b/l  Skin: Skin is warm, dry and intact  No rash noted  She is not diaphoretic  No erythema  No pallor  Psychiatric: Her speech is normal    Nursing note and vitals reviewed  ED Medications  Medications   acetaminophen (TYLENOL) tablet 650 mg (650 mg Oral Given 3/27/18 1428)       Diagnostic Studies  Results Reviewed     None                 XR sacrum and coccyx   Final Result by Gabrielle Valencia MD (03/27 1512)      No fracture           Workstation performed: [id number]         CT spine cervical without contrast   Final Result by Josee Maxwell Jj Richey MD (03/27 3750)      No cervical spine fracture or traumatic malalignment  Dental jean carlos involving a left mandibular molar  Workstation performed: [id number]         CT head without contrast   Final Result by Nehemias Tyler MD (03/27 0284)      No acute intracranial abnormality  Workstation performed: [id number]                    Procedures  Procedures       Phone Contacts  ED Phone Contact    ED Course  ED Course                                MDM  Number of Diagnoses or Management Options  Acute head injury without loss of consciousness: new and requires workup  Fall: new and requires workup  Tailbone injury: new and requires workup  Diagnosis management comments: Differential diagnosis including but not limited to: sprain, strain, fracture, dislocation, contusion, ICH  Plan: XR  CT head and neck  dispo pending  Analgesia  She drove in and is unable to take any medicines that may affect her ability to operate machinery  We discussed CT scan of chest and abdomen pelvis this time and agreed to defer  She states she has no chest pain no abdominal pain and no pelvic pain and so we will hold off  Amount and/or Complexity of Data Reviewed  Tests in the radiology section of CPT®: ordered and reviewed  Independent visualization of images, tracings, or specimens: yes    Risk of Complications, Morbidity, and/or Mortality  Presenting problems: moderate  Management options: low  General comments: 48 y/o female here for evaluation of a fall  She was given analgesia  She is resting in bed at this point time  Her CT head shows no acute intracranial abnormalities  Cervical spine unremarkable  Tailbone x-ray with no acute osseous abnormalities  She is likely suffering from contusion and paraspinal spasm of the cervical spine  We will discharge home with muscle relaxer  Have her follow up with PCP as needed  We discussed return parameters    We discussed safe use of the muscle relaxer  Patient understands and agrees with the plan  Patient Progress  Patient progress: stable    CritCare Time    Disposition  Final diagnoses:   Fall   Acute head injury without loss of consciousness   Tailbone injury     Time reflects when diagnosis was documented in both MDM as applicable and the Disposition within this note     Time User Action Codes Description Comment    3/27/2018  3:32 PM Calista Allegra Add [N25  XXXA] Fall     3/27/2018  3:33 PM Sonjia Bacca L Add [M36 55LB] Acute head injury without loss of consciousness     3/27/2018  3:33 PM Calista Allegra Add [S39 92XA] Tailbone injury     3/27/2018  3:33 PM Calista Allegra Modify [Q66  XXXA] Fall     3/27/2018  3:33 PM Sonjia Bacca L Modify [S09 90XA] Acute head injury without loss of consciousness       ED Disposition     ED Disposition Condition Comment    Discharge  Sadie Bazzi discharge to home/self care      Condition at discharge: Good        Follow-up Information     Follow up With Specialties Details Why Contact Info    Deisy Rodriguez MD [facility] Call for follow up in 1-2 weeks [address]  [phone]          Discharge Medication List as of 3/27/2018  3:34 PM      START taking these medications    Details   cyclobenzaprine (FLEXERIL) 10 mg tablet Take 1 tablet (10 mg total) by mouth 3 (three) times a day as needed for muscle spasms, Starting Tue 3/27/2018, Print         CONTINUE these medications which have NOT CHANGED    Details   albuterol (PROVENTIL HFA,VENTOLIN HFA) 90 mcg/act inhaler Inhale 2 puffs every 4 (four) hours as needed for wheezing, Starting Fri 12/8/2017, Print      benzocaine (HURRICAINE) 20 % 1 application by Mucosal route 3 (three) times a day as needed for mucositis, Starting Sat 11/11/2017, Print      benzonatate (TESSALON PERLES) 100 mg capsule Take 1 capsule by mouth every 8 (eight) hours, Starting Sat 12/9/2017, Print      dicyclomine (BENTYL) 20 mg tablet Take 1 tablet every 6 hours for abdominal cramping, Print      ibuprofen (MOTRIN) 600 mg tablet Take 1 tablet by mouth every 6 (six) hours as needed for mild pain, Starting Sat 11/11/2017, Print      ondansetron (ZOFRAN) 4 mg tablet Take 1 tablet by mouth every 6 (six) hours, Starting Sat 1/6/2018, Print      valACYclovir (VALTREX) 500 mg tablet Take 1 tablet by mouth 2 (two) times a day for 3 days, Starting Sat 1/6/2018, Until Tue 1/9/2018, Print           No discharge procedures on file      ED Provider  Electronically Signed by           Juan Arredondo PA-C  03/27/18 1541

## 2018-03-27 NOTE — DISCHARGE INSTRUCTIONS
Return to the Emergency Department sooner if increased pain, fever, vomiting, difficulty breathing, rash  Head Injury   WHAT YOU NEED TO KNOW:   A head injury is most often caused by a blow to the head  This may occur from a fall, bicycle injury, sports injury, being struck in the head, or a motor vehicle accident  DISCHARGE INSTRUCTIONS:   Call 911 or have someone else call for any of the following:   · You cannot be woken  · You have a seizure  · You stop responding to others or you faint  · You have blurry or double vision  · Your speech becomes slurred or confused  · You have arm or leg weakness, loss of feeling, or new problems with coordination  · Your pupils are larger than usual or one pupil is a different size than the other  · You have blood or clear fluid coming out of your ears or nose  Return to the emergency department if:   · You have repeated or forceful vomiting  · You feel confused  · Your headache gets worse or becomes severe  · You or someone caring for you notices that you are harder to wake than usual   Contact your healthcare provider if:   · Your symptoms last longer than 6 weeks after the injury  · You have questions or concerns about your condition or care  Medicines:   · Acetaminophen  decreases pain  Acetaminophen is available without a doctor's order  Ask how much to take and how often to take it  Follow directions  Acetaminophen can cause liver damage if not taken correctly  · Take your medicine as directed  Contact your healthcare provider if you think your medicine is not helping or if you have side effects  Tell him or her if you are allergic to any medicine  Keep a list of the medicines, vitamins, and herbs you take  Include the amounts, and when and why you take them  Bring the list or the pill bottles to follow-up visits  Carry your medicine list with you in case of an emergency    Self-care:   · Rest  or do quiet activities for 24 to 48 hours  Limit your time watching TV, using the computer, or doing tasks that require a lot of thinking  Slowly return to your normal activities as directed  Do not play sports or do activities that may cause you to get hit in the head  Ask your healthcare provider when you can return to sports  · Apply ice  on your head for 15 to 20 minutes every hour or as directed  Use an ice pack, or put crushed ice in a plastic bag  Cover it with a towel before you apply it to your skin  Ice helps prevent tissue damage and decreases swelling and pain  · Have someone stay with you for 24 hours  or as directed  This person can monitor you for complications and call [phone]  When you are awake the person should ask you a few questions to see if you are thinking clearly  An example would be to ask your name or your address  Prevent another head injury:   · Wear a helmet that fits properly  Do this when you play sports, or ride a bike, scooter, or skateboard  Helmets help decrease your risk of a serious head injury  Talk to your healthcare provider about other ways you can protect yourself if you play sports  · Wear your seat belt every time you are in a car  This helps to decrease your risk for a head injury if you are in a car accident  Follow up with your healthcare provider as directed:  Write down your questions so you remember to ask them during your visits  © 2017 [employer] Information is for End User's use only and may not be sold, redistributed or otherwise used for commercial purposes  All illustrations and images included in CareNotes® are the copyrighted property of [employer]  or [employer]  The above information is an  only  It is not intended as medical advice for individual conditions or treatments  Talk to your doctor, nurse or pharmacist before following any medical regimen to see if it is safe and effective for you        Contusion in Adults   WHAT YOU NEED TO KNOW:   What is a contusion? A contusion is a bruise that appears on your skin after an injury  A bruise happens when small blood vessels tear but skin does not  When blood vessels tear, blood leaks into nearby tissue, such as soft tissue or muscle  What causes a contusion? A hard object or a strained muscle can leave a bruise on your skin  A twisted knee or ankle can cause a bone bruise  You may get a bruise near an area where you have had blood taken for medical tests  What increases my risk for a contusion? · A bleeding disorder that makes you bleed more easily    · Kidney or liver disease, or an infection    · A family history of bleeding problems    · Medicines such as blood thinners or certain over-the-counter medicines and herbal medicines    · Weakened skin and muscles from older age or poor nutrition  What other signs and symptoms may I have with a contusion? · Pain that increases when you touch the bruise, walk, or use the area around the bruise    · Swelling or a lump at the site of the bruise or near it    · Red, blue, or black skin that may change to green or yellow after a few days           · Stiffness or problems moving the bruised area of your body  How is a contusion diagnosed? Your healthcare provider may ask about any injuries, infections, or bleeding problems you had in the past  He or she will check the skin over the injured area  He or she may touch it to see where it hurts  He or she may also check for problems you may have when you move your bruised area  You may need any of the following tests:  · Blood tests  may be used to check for blood disorders or to see how long it takes for your blood to clot  · Ultrasound  pictures may show how deep the bruise is and if any of your organs, such as your liver, are injured  · MRI  pictures may show if a hematoma (pooling of blood) has started to form  You may be given contrast liquid to help the pictures show up better   Tell the healthcare provider if you have ever had an allergic reaction to contrast liquid  Do not enter the MRI room with anything metal  Metal can cause serious injury  Tell the healthcare provider if you have any metal in or on your body  How is a contusion treated? Your bruise may heal without any treatment  Treatment depends on the part of your body that is injured, and how serious your injury is  You may need any of the following:  · NSAIDs , such as ibuprofen, help decrease swelling, pain, and fever  This medicine is available with or without a doctor's order  NSAIDs can cause stomach bleeding or kidney problems in certain people  If you take blood thinner medicine, always ask your healthcare provider if NSAIDs are safe for you  Always read the medicine label and follow directions  · Prescription pain medicine  may be given  Do not wait until the pain is severe before you take your medicine  · Aspiration  is a procedure to drain pooled blood in your muscle  This prevents increased pressure in the muscle  · Surgery  may be done to repair a tear in the muscle or relieve pressure in the muscle caused by swelling  What can I do to help my contusion heal?   · Rest the injured area  or use it less than usual  If you bruised your leg or foot, you may need crutches or a cane to help you walk  This will help you keep weight off your injured body part  · Apply ice  to decrease swelling and pain  Ice may also help prevent tissue damage  Use an ice pack, or put crushed ice in a plastic bag  Cover it with a towel and place it on your bruise for 15 to 20 minutes every hour or as directed  · Use compression  to support the area and decrease swelling  Wrap an elastic bandage around the area over the bruised muscle  Make sure the bandage is not too tight  You should be able to fit 1 finger between the bandage and your skin      · Elevate (raise) your injured body part  above the level of your heart to help decrease pain and swelling  Use pillows, blankets, or rolled towels to elevate the area as often as you can  · Do not drink alcohol  as directed  Alcohol may slow healing  · Do not stretch injured muscles  right after your injury  Ask your healthcare provider when and how you may safely stretch after your injury  Gentle stretches can help increase your flexibility  · Do not massage the area or put heating pads  on the bruise right after your injury  Heat and massage may slow healing  Your healthcare provider may tell you to apply heat after several days  At that time, heat will start to help the injury heal   How can I prevent a contusion? · Stretch and warm up before you play sports or exercise  · Wear protective gear when you play sports  Examples are shin guards and padding  · If you begin a new physical activity, start slowly to give your body a chance to adjust   When should I seek immediate care? · You have new trouble moving the injured area  · You have tingling or numbness in or near the injured area  · Your hand or foot below the bruise gets cold or turns pale  When should I contact my healthcare provider? · You find a new lump in the injured area  · Your symptoms do not improve with treatment after 4 to 5 days  · You have questions or concerns about your condition or care  CARE AGREEMENT:   You have the right to help plan your care  Learn about your health condition and how it may be treated  Discuss treatment options with your caregivers to decide what care you want to receive  You always have the right to refuse treatment  The above information is an  only  It is not intended as medical advice for individual conditions or treatments  Talk to your doctor, nurse or pharmacist before following any medical regimen to see if it is safe and effective for you    © 2017 [employer] Information is for End User's use only and may not be sold, redistributed or otherwise used for commercial purposes  All illustrations and images included in CareNotes® are the copyrighted property of [employer]  or [employer]

## 2019-03-25 ENCOUNTER — HOSPITAL ENCOUNTER (EMERGENCY)
Facility: HOSPITAL | Age: 49
Discharge: HOME | End: 2019-03-25
Attending: EMERGENCY MEDICINE | Admitting: EMERGENCY MEDICINE

## 2019-03-25 ENCOUNTER — APPOINTMENT (EMERGENCY)
Dept: RADIOLOGY | Facility: HOSPITAL | Age: 49
End: 2019-03-25

## 2019-03-25 VITALS
HEIGHT: 64 IN | WEIGHT: 193.78 LBS | RESPIRATION RATE: 20 BRPM | TEMPERATURE: 102.1 F | SYSTOLIC BLOOD PRESSURE: 145 MMHG | DIASTOLIC BLOOD PRESSURE: 77 MMHG | HEART RATE: 93 BPM | BODY MASS INDEX: 33.08 KG/M2 | OXYGEN SATURATION: 100 %

## 2019-03-25 DIAGNOSIS — B34.9 VIRAL SYNDROME: ICD-10-CM

## 2019-03-25 DIAGNOSIS — R68.89 FLU-LIKE SYMPTOMS: Primary | ICD-10-CM

## 2019-03-25 LAB
ALBUMIN SERPL BCP-MCNC: 3.6 G/DL (ref 3.5–5)
ALP SERPL-CCNC: 98 U/L (ref 46–116)
ALT SERPL W P-5'-P-CCNC: 22 U/L (ref 12–78)
ANION GAP SERPL CALCULATED.3IONS-SCNC: 11 MMOL/L (ref 4–13)
AST SERPL W P-5'-P-CCNC: 36 U/L (ref 5–45)
BACTERIA UR QL AUTO: ABNORMAL /HPF
BASOPHILS # BLD AUTO: 0.03 THOUSANDS/ΜL (ref 0–0.1)
BASOPHILS NFR BLD AUTO: 1 % (ref 0–1)
BILIRUB SERPL-MCNC: 0.2 MG/DL (ref 0.2–1)
BILIRUB UR QL STRIP: NEGATIVE
BUN SERPL-MCNC: 10 MG/DL (ref 5–25)
CALCIUM SERPL-MCNC: 8.7 MG/DL (ref 8.3–10.1)
CHLORIDE SERPL-SCNC: 102 MMOL/L (ref 100–108)
CLARITY UR: CLEAR
CO2 SERPL-SCNC: 25 MMOL/L (ref 21–32)
COLOR UR: YELLOW
CREAT SERPL-MCNC: 0.57 MG/DL (ref 0.6–1.3)
EOSINOPHIL # BLD AUTO: 0.04 THOUSAND/ΜL (ref 0–0.61)
EOSINOPHIL NFR BLD AUTO: 1 % (ref 0–6)
ERYTHROCYTE [DISTWIDTH] IN BLOOD BY AUTOMATED COUNT: 14.5 % (ref 11.6–15.1)
GFR SERPL CREATININE-BSD FRML MDRD: 126 ML/MIN/1.73SQ M
GLUCOSE SERPL-MCNC: 92 MG/DL (ref 65–140)
GLUCOSE UR STRIP-MCNC: NEGATIVE MG/DL
HCT VFR BLD AUTO: 36.7 % (ref 34.8–46.1)
HGB BLD-MCNC: 12.2 G/DL (ref 11.5–15.4)
HGB UR QL STRIP.AUTO: ABNORMAL
IMM GRANULOCYTES # BLD AUTO: 0.02 THOUSAND/UL (ref 0–0.2)
IMM GRANULOCYTES NFR BLD AUTO: 0 % (ref 0–2)
KETONES UR STRIP-MCNC: ABNORMAL MG/DL
LEUKOCYTE ESTERASE UR QL STRIP: NEGATIVE
LYMPHOCYTES # BLD AUTO: 1.12 THOUSANDS/ΜL (ref 0.6–4.47)
LYMPHOCYTES NFR BLD AUTO: 21 % (ref 14–44)
MCH RBC QN AUTO: 27.7 PG (ref 26.8–34.3)
MCHC RBC AUTO-ENTMCNC: 33.2 G/DL (ref 31.4–37.4)
MCV RBC AUTO: 83 FL (ref 82–98)
MONOCYTES # BLD AUTO: 0.92 THOUSAND/ΜL (ref 0.17–1.22)
MONOCYTES NFR BLD AUTO: 17 % (ref 4–12)
NEUTROPHILS # BLD AUTO: 3.25 THOUSANDS/ΜL (ref 1.85–7.62)
NEUTS SEG NFR BLD AUTO: 60 % (ref 43–75)
NITRITE UR QL STRIP: NEGATIVE
NON-SQ EPI CELLS URNS QL MICRO: ABNORMAL /HPF
NRBC BLD AUTO-RTO: 0 /100 WBCS
PH UR STRIP.AUTO: 5.5 [PH]
PLATELET # BLD AUTO: 175 THOUSANDS/UL (ref 149–390)
PMV BLD AUTO: 12 FL (ref 8.9–12.7)
POTASSIUM SERPL-SCNC: 3.7 MMOL/L (ref 3.5–5.3)
PROT SERPL-MCNC: 8.1 G/DL (ref 6.4–8.2)
PROT UR STRIP-MCNC: ABNORMAL MG/DL
RBC # BLD AUTO: 4.4 MILLION/UL (ref 3.81–5.12)
RBC #/AREA URNS AUTO: ABNORMAL /HPF
SODIUM SERPL-SCNC: 138 MMOL/L (ref 136–145)
SP GR UR STRIP.AUTO: >=1.03 (ref 1–1.03)
TROPONIN I SERPL-MCNC: <0.02 NG/ML
UROBILINOGEN UR QL STRIP.AUTO: 0.2 E.U./DL
WBC # BLD AUTO: 5.38 THOUSAND/UL (ref 4.31–10.16)
WBC #/AREA URNS AUTO: ABNORMAL /HPF

## 2019-03-25 PROCEDURE — 80053 COMPREHEN METABOLIC PANEL: CPT | Performed by: EMERGENCY MEDICINE

## 2019-03-25 PROCEDURE — 85025 COMPLETE CBC W/AUTO DIFF WBC: CPT | Performed by: EMERGENCY MEDICINE

## 2019-03-25 PROCEDURE — 36415 COLL VENOUS BLD VENIPUNCTURE: CPT | Performed by: EMERGENCY MEDICINE

## 2019-03-25 PROCEDURE — 96361 HYDRATE IV INFUSION ADD-ON: CPT

## 2019-03-25 PROCEDURE — 93005 ELECTROCARDIOGRAM TRACING: CPT

## 2019-03-25 PROCEDURE — 99284 EMERGENCY DEPT VISIT MOD MDM: CPT

## 2019-03-25 PROCEDURE — 96360 HYDRATION IV INFUSION INIT: CPT

## 2019-03-25 PROCEDURE — 81001 URINALYSIS AUTO W/SCOPE: CPT | Performed by: EMERGENCY MEDICINE

## 2019-03-25 PROCEDURE — 71046 X-RAY EXAM CHEST 2 VIEWS: CPT

## 2019-03-25 PROCEDURE — 84484 ASSAY OF TROPONIN QUANT: CPT | Performed by: EMERGENCY MEDICINE

## 2019-03-25 RX ORDER — ALBUTEROL SULFATE 90 UG/1
2 AEROSOL, METERED RESPIRATORY (INHALATION) ONCE
Status: COMPLETED | OUTPATIENT
Start: 2019-03-25 | End: 2019-03-25

## 2019-03-25 RX ORDER — BENZONATATE 100 MG/1
100 CAPSULE ORAL ONCE
Status: COMPLETED | OUTPATIENT
Start: 2019-03-25 | End: 2019-03-25

## 2019-03-25 RX ORDER — BENZONATATE 100 MG/1
100 CAPSULE ORAL EVERY 8 HOURS
Qty: 21 CAPSULE | Refills: 0 | Status: ON HOLD | OUTPATIENT
Start: 2019-03-25 | End: 2020-05-27

## 2019-03-25 RX ORDER — ACETAMINOPHEN 325 MG/1
975 TABLET ORAL ONCE
Status: COMPLETED | OUTPATIENT
Start: 2019-03-25 | End: 2019-03-25

## 2019-03-25 RX ORDER — PREDNISONE 20 MG/1
60 TABLET ORAL DAILY
Qty: 15 TABLET | Refills: 0 | Status: SHIPPED | OUTPATIENT
Start: 2019-03-25 | End: 2019-03-30

## 2019-03-25 RX ORDER — PREDNISONE 20 MG/1
60 TABLET ORAL ONCE
Status: COMPLETED | OUTPATIENT
Start: 2019-03-25 | End: 2019-03-25

## 2019-03-25 RX ORDER — IBUPROFEN 600 MG/1
600 TABLET ORAL ONCE
Status: COMPLETED | OUTPATIENT
Start: 2019-03-25 | End: 2019-03-25

## 2019-03-25 RX ORDER — ALBUTEROL SULFATE 90 UG/1
2 AEROSOL, METERED RESPIRATORY (INHALATION) EVERY 4 HOURS PRN
Qty: 1 INHALER | Refills: 0 | Status: SHIPPED | OUTPATIENT
Start: 2019-03-25

## 2019-03-25 RX ADMIN — ALBUTEROL SULFATE 2 PUFF: 90 AEROSOL, METERED RESPIRATORY (INHALATION) at 21:41

## 2019-03-25 RX ADMIN — SODIUM CHLORIDE 1000 ML: 0.9 INJECTION, SOLUTION INTRAVENOUS at 19:36

## 2019-03-25 RX ADMIN — ACETAMINOPHEN 975 MG: 325 TABLET, FILM COATED ORAL at 19:32

## 2019-03-25 RX ADMIN — BENZONATATE 100 MG: 100 CAPSULE ORAL at 21:40

## 2019-03-25 RX ADMIN — PREDNISONE 60 MG: 20 TABLET ORAL at 21:40

## 2019-03-25 RX ADMIN — IBUPROFEN 600 MG: 600 TABLET ORAL at 19:32

## 2019-03-26 LAB
ATRIAL RATE: 101 BPM
P AXIS: 39 DEGREES
PR INTERVAL: 128 MS
QRS AXIS: 52 DEGREES
QRSD INTERVAL: 74 MS
QT INTERVAL: 348 MS
QTC INTERVAL: 451 MS
T WAVE AXIS: 47 DEGREES
VENTRICULAR RATE: 101 BPM

## 2019-03-26 PROCEDURE — 93010 ELECTROCARDIOGRAM REPORT: CPT | Performed by: INTERNAL MEDICINE

## 2019-03-26 NOTE — ED PROVIDER NOTES
History  Chief Complaint   Patient presents with    Flu Symptoms     patient presents with flu like symptoms since yesterday  51year old female pt presents to the ED with cc of flu like illness  She states chest pain which is worse with cough  She states though she was concered for other causes  History provided by:  Patient   used: No    Flu Symptoms   Presenting symptoms: cough and fatigue    Severity:  Mild  Onset quality:  Gradual  Progression:  Worsening  Chronicity:  New  Relieved by:  Nothing  Worsened by:  Nothing  Ineffective treatments:  None tried  Associated symptoms: no decrease in physical activity    Risk factors: no diabetes problem, no immunocompromised state and no liver disease        Prior to Admission Medications   Prescriptions Last Dose Informant Patient Reported? Taking? albuterol (PROVENTIL HFA,VENTOLIN HFA) 90 mcg/act inhaler   No No   Sig: Inhale 2 puffs every 4 (four) hours as needed for wheezing   benzocaine (HURRICAINE) 20 %   No No   Si application by Mucosal route 3 (three) times a day as needed for mucositis   benzonatate (TESSALON PERLES) 100 mg capsule   No No   Sig: Take 1 capsule by mouth every 8 (eight) hours   cyclobenzaprine (FLEXERIL) 10 mg tablet   No No   Sig: Take 1 tablet (10 mg total) by mouth 3 (three) times a day as needed for muscle spasms   dicyclomine (BENTYL) 20 mg tablet   No No   Sig: Take 1 tablet every 6 hours for abdominal cramping   ibuprofen (MOTRIN) 600 mg tablet   No No   Sig: Take 1 tablet by mouth every 6 (six) hours as needed for mild pain   ondansetron (ZOFRAN) 4 mg tablet   No No   Sig: Take 1 tablet by mouth every 6 (six) hours   valACYclovir (VALTREX) 500 mg tablet   No No   Sig: Take 1 tablet by mouth 2 (two) times a day for 3 days      Facility-Administered Medications: None       History reviewed  No pertinent past medical history      Past Surgical History:   Procedure Laterality Date    HYSTERECTOMY History reviewed  No pertinent family history  I have reviewed and agree with the history as documented  Social History     Tobacco Use    Smoking status: Current Every Day Smoker     Packs/day: 0 20    Smokeless tobacco: Never Used   Substance Use Topics    Alcohol use: No    Drug use: No        Review of Systems   Constitutional: Positive for fatigue  Respiratory: Positive for cough  All other systems reviewed and are negative  Physical Exam  Physical Exam   Constitutional: She is oriented to person, place, and time  She appears well-developed and well-nourished  HENT:   Head: Normocephalic and atraumatic  Right Ear: External ear normal    Left Ear: External ear normal    Eyes: Conjunctivae and EOM are normal    Neck: No JVD present  No tracheal deviation present  No thyromegaly present  Cardiovascular: Normal rate  Pulmonary/Chest: Effort normal and breath sounds normal  No stridor  Abdominal: Soft  She exhibits no distension and no mass  There is no tenderness  There is no guarding  No hernia  Musculoskeletal: Normal range of motion  She exhibits no edema, tenderness or deformity  Lymphadenopathy:     She has no cervical adenopathy  Neurological: She is alert and oriented to person, place, and time  Skin: Skin is warm  No rash noted  No erythema  No pallor  Psychiatric: She has a normal mood and affect  Her behavior is normal    Nursing note and vitals reviewed        Vital Signs  ED Triage Vitals [03/25/19 1750]   Temperature Pulse Respirations Blood Pressure SpO2   (!) 102 1 °F (38 9 °C) (!) 109 18 (!) 185/95 97 %      Temp Source Heart Rate Source Patient Position - Orthostatic VS BP Location FiO2 (%)   Oral Monitor Sitting Left arm --      Pain Score       Worst Possible Pain           Vitals:    03/25/19 1750 03/25/19 1945 03/25/19 2100   BP: (!) 185/95 161/88 145/77   Pulse: (!) 109 102 93   Patient Position - Orthostatic VS: Sitting Sitting Sitting         Visual Acuity      ED Medications  Medications   acetaminophen (TYLENOL) tablet 975 mg (975 mg Oral Given 3/25/19 1932)   ibuprofen (MOTRIN) tablet 600 mg (600 mg Oral Given 3/25/19 1932)   sodium chloride 0 9 % bolus 1,000 mL (0 mL Intravenous Stopped 3/25/19 2142)   benzonatate (TESSALON PERLES) capsule 100 mg (100 mg Oral Given 3/25/19 2140)   predniSONE tablet 60 mg (60 mg Oral Given 3/25/19 2140)   albuterol (PROVENTIL HFA,VENTOLIN HFA) inhaler 2 puff (2 puffs Inhalation Given 3/25/19 2141)   albuterol (PROVENTIL HFA,VENTOLIN HFA) inhaler 2 puff (2 puffs Inhalation Given 3/25/19 2141)       Diagnostic Studies  Results Reviewed     Procedure Component Value Units Date/Time    Troponin I [[id number]]  (Normal) Collected:  03/25/19 1932    Lab Status:  Final result Specimen:  Blood from Arm, Left Updated:  03/25/19 2019     Troponin I <0 02 ng/mL     Comprehensive metabolic panel [[id number]]  (Abnormal) Collected:  03/25/19 1932    Lab Status:  Final result Specimen:  Blood from Arm, Left Updated:  03/25/19 2015     Sodium 138 mmol/L      Potassium 3 7 mmol/L      Chloride 102 mmol/L      CO2 25 mmol/L      ANION GAP 11 mmol/L      BUN 10 mg/dL      Creatinine 0 57 mg/dL      Glucose 92 mg/dL      Calcium 8 7 mg/dL      AST 36 U/L      ALT 22 U/L      Alkaline Phosphatase 98 U/L      Total Protein 8 1 g/dL      Albumin 3 6 g/dL      Total Bilirubin 0 20 mg/dL      eGFR 126 ml/min/1 73sq m     Narrative:       National Kid[employer] Disease Education Program recommendations are as follows:  GFR calculation is accurate only with a steady state creatinine  Chronic Kidney disease less than 60 ml/min/1 73 sq  meters  Kidney failure less than 15 ml/min/1 73 sq  meters      Urine Microscopic [[id number]]  (Abnormal) Collected:  03/25/19 1932    Lab Status:  Final result Specimen:  Urine, Other Updated:  03/25/19 2012     RBC, UA 0-1 /hpf      WBC, UA None Seen /hpf      Epithelial Cells Occasional /hpf      Bacteria, UA Occasional /hpf UA w Reflex to Microscopic w Reflex to Culture [[id number]]  (Abnormal) Collected:  03/25/19 1932    Lab Status:  Final result Specimen:  Urine, Other Updated:  03/25/19 2002     Color, UA Yellow     Clarity, UA Clear     Specific Gravity, UA >=1 030     pH, UA 5 5     Leukocytes, UA Negative     Nitrite, UA Negative     Protein, UA 30 (1+) mg/dl      Glucose, UA Negative mg/dl      Ketones, UA 15 (1+) mg/dl      Urobilinogen, UA 0 2 E U /dl      Bilirubin, UA Negative     Blood, UA Moderate    CBC and differential [[id number]]  (Abnormal) Collected:  03/25/19 1932    Lab Status:  Final result Specimen:  Blood from Arm, Left Updated:  03/25/19 1955     WBC 5 38 Thousand/uL      RBC 4 40 Million/uL      Hemoglobin 12 2 g/dL      Hematocrit 36 7 %      MCV 83 fL      MCH 27 7 pg      MCHC 33 2 g/dL      RDW 14 5 %      MPV 12 0 fL      Platelets 730 Thousands/uL      nRBC 0 /100 WBCs      Neutrophils Relative 60 %      Immat GRANS % 0 %      Lymphocytes Relative 21 %      Monocytes Relative 17 %      Eosinophils Relative 1 %      Basophils Relative 1 %      Neutrophils Absolute 3 25 Thousands/µL      Immature Grans Absolute 0 02 Thousand/uL      Lymphocytes Absolute 1 12 Thousands/µL      Monocytes Absolute 0 92 Thousand/µL      Eosinophils Absolute 0 04 Thousand/µL      Basophils Absolute 0 03 Thousands/µL                  XR chest pa & lateral   Final Result by Anayeli Yañez MD (03/26 0845)      No acute cardiopulmonary disease              Workstation performed: [id number]                    Procedures  Procedures       Phone Contacts  ED Phone Contact    ED Course                               MDM  Number of Diagnoses or Management Options  Flu-like symptoms: new and requires workup  Viral syndrome: new and requires workup     Amount and/or Complexity of Data Reviewed  Decide to obtain previous medical records or to obtain history from someone other than the patient: yes  Review and summarize past medical records: yes    Patient Progress  Patient progress: stable      Disposition  Final diagnoses:   Flu-like symptoms   Viral syndrome     Time reflects when diagnosis was documented in both MDM as applicable and the Disposition within this note     Time User Action Codes Description Comment    3/25/2019  9:16 PM Negin Pastrana Add [R68 89] Flu-like symptoms     3/25/2019  9:17 PM Negin Pastrana Add [B34 9] Viral syndrome       ED Disposition     ED Disposition Condition Date/Time Comment    Discharge Stable Mon Mar 25, 2019  9:16 PM Sadie Bazzi discharge to home/self care  Follow-up Information    None         Discharge Medication List as of 3/25/2019  9:17 PM      START taking these medications    Details   !! albuterol (PROVENTIL HFA,VENTOLIN HFA) 90 mcg/act inhaler Inhale 2 puffs every 4 (four) hours as needed for wheezing, Starting Mon 3/25/2019, Print      !! benzonatate (TESSALON PERLES) 100 mg capsule Take 1 capsule (100 mg total) by mouth every 8 (eight) hours, Starting Mon 3/25/2019, Print      predniSONE 20 mg tablet Take 3 tablets (60 mg total) by mouth daily for 5 days, Starting Mon 3/25/2019, Until Sat 3/30/2019, Print       !! - Potential duplicate medications found  Please discuss with provider        CONTINUE these medications which have NOT CHANGED    Details   !! albuterol (PROVENTIL HFA,VENTOLIN HFA) 90 mcg/act inhaler Inhale 2 puffs every 4 (four) hours as needed for wheezing, Starting Fri 12/8/2017, Print      benzocaine (HURRICAINE) 20 % 1 application by Mucosal route 3 (three) times a day as needed for mucositis, Starting Sat 11/11/2017, Print      !! benzonatate (TESSALON PERLES) 100 mg capsule Take 1 capsule by mouth every 8 (eight) hours, Starting Sat 12/9/2017, Print      cyclobenzaprine (FLEXERIL) 10 mg tablet Take 1 tablet (10 mg total) by mouth 3 (three) times a day as needed for muscle spasms, Starting Tue 3/27/2018, Print      dicyclomine (BENTYL) 20 mg tablet Take 1 tablet every 6 hours for abdominal cramping, Print      ibuprofen (MOTRIN) 600 mg tablet Take 1 tablet by mouth every 6 (six) hours as needed for mild pain, Starting Sat 11/11/2017, Print      ondansetron (ZOFRAN) 4 mg tablet Take 1 tablet by mouth every 6 (six) hours, Starting Sat 1/6/2018, Print      valACYclovir (VALTREX) 500 mg tablet Take 1 tablet by mouth 2 (two) times a day for 3 days, Starting Sat 1/6/2018, Until Tue 1/9/2018, Print       !! - Potential duplicate medications found  Please discuss with provider  No discharge procedures on file      ED Provider  Electronically Signed by           Kamran Sibley DO  03/26/19 9226

## 2019-12-22 ENCOUNTER — APPOINTMENT (EMERGENCY)
Dept: CT IMAGING | Facility: HOSPITAL | Age: 49
End: 2019-12-22

## 2019-12-22 ENCOUNTER — HOSPITAL ENCOUNTER (EMERGENCY)
Facility: HOSPITAL | Age: 49
Discharge: HOME | End: 2019-12-22
Attending: EMERGENCY MEDICINE

## 2019-12-22 VITALS
RESPIRATION RATE: 16 BRPM | WEIGHT: 170 LBS | SYSTOLIC BLOOD PRESSURE: 133 MMHG | TEMPERATURE: 98.3 F | OXYGEN SATURATION: 100 % | BODY MASS INDEX: 29.02 KG/M2 | HEART RATE: 77 BPM | DIASTOLIC BLOOD PRESSURE: 80 MMHG | HEIGHT: 64 IN

## 2019-12-22 DIAGNOSIS — E87.1 HYPONATREMIA: ICD-10-CM

## 2019-12-22 DIAGNOSIS — R10.9 FLANK PAIN: Primary | ICD-10-CM

## 2019-12-22 LAB
ALBUMIN SERPL BCP-MCNC: 3.7 G/DL (ref 3.5–5)
ALP SERPL-CCNC: 115 U/L (ref 46–116)
ALT SERPL W P-5'-P-CCNC: 24 U/L (ref 12–78)
ANION GAP SERPL CALCULATED.3IONS-SCNC: 1 MMOL/L (ref 4–13)
ANION GAP SERPL CALCULATED.3IONS-SCNC: 8 MMOL/L (ref 4–13)
AST SERPL W P-5'-P-CCNC: 17 U/L (ref 5–45)
BACTERIA UR QL AUTO: ABNORMAL /HPF
BASOPHILS # BLD AUTO: 0.05 THOUSANDS/ΜL (ref 0–0.1)
BASOPHILS NFR BLD AUTO: 1 % (ref 0–1)
BILIRUB SERPL-MCNC: 0.3 MG/DL (ref 0.2–1)
BILIRUB UR QL STRIP: NEGATIVE
BUN SERPL-MCNC: 16 MG/DL (ref 5–25)
BUN SERPL-MCNC: 18 MG/DL (ref 5–25)
CALCIUM SERPL-MCNC: 8.1 MG/DL (ref 8.3–10.1)
CALCIUM SERPL-MCNC: 9 MG/DL (ref 8.3–10.1)
CHLORIDE SERPL-SCNC: 100 MMOL/L (ref 100–108)
CHLORIDE SERPL-SCNC: 106 MMOL/L (ref 100–108)
CLARITY UR: CLEAR
CO2 SERPL-SCNC: 27 MMOL/L (ref 21–32)
CO2 SERPL-SCNC: 28 MMOL/L (ref 21–32)
COLOR UR: YELLOW
CREAT SERPL-MCNC: 0.64 MG/DL (ref 0.6–1.3)
CREAT SERPL-MCNC: 0.65 MG/DL (ref 0.6–1.3)
EOSINOPHIL # BLD AUTO: 0.28 THOUSAND/ΜL (ref 0–0.61)
EOSINOPHIL NFR BLD AUTO: 5 % (ref 0–6)
ERYTHROCYTE [DISTWIDTH] IN BLOOD BY AUTOMATED COUNT: 14.5 % (ref 11.6–15.1)
GFR SERPL CREATININE-BSD FRML MDRD: 120 ML/MIN/1.73SQ M
GFR SERPL CREATININE-BSD FRML MDRD: 120 ML/MIN/1.73SQ M
GLUCOSE SERPL-MCNC: 100 MG/DL (ref 65–140)
GLUCOSE SERPL-MCNC: 110 MG/DL (ref 65–140)
GLUCOSE UR STRIP-MCNC: NEGATIVE MG/DL
HCT VFR BLD AUTO: 39.1 % (ref 34.8–46.1)
HGB BLD-MCNC: 12.5 G/DL (ref 11.5–15.4)
HGB UR QL STRIP.AUTO: ABNORMAL
IMM GRANULOCYTES # BLD AUTO: 0.01 THOUSAND/UL (ref 0–0.2)
IMM GRANULOCYTES NFR BLD AUTO: 0 % (ref 0–2)
KETONES UR STRIP-MCNC: NEGATIVE MG/DL
LEUKOCYTE ESTERASE UR QL STRIP: NEGATIVE
LIPASE SERPL-CCNC: 69 U/L (ref 73–393)
LYMPHOCYTES # BLD AUTO: 2.64 THOUSANDS/ΜL (ref 0.6–4.47)
LYMPHOCYTES NFR BLD AUTO: 48 % (ref 14–44)
MCH RBC QN AUTO: 27.5 PG (ref 26.8–34.3)
MCHC RBC AUTO-ENTMCNC: 32 G/DL (ref 31.4–37.4)
MCV RBC AUTO: 86 FL (ref 82–98)
MONOCYTES # BLD AUTO: 0.53 THOUSAND/ΜL (ref 0.17–1.22)
MONOCYTES NFR BLD AUTO: 10 % (ref 4–12)
NEUTROPHILS # BLD AUTO: 2.01 THOUSANDS/ΜL (ref 1.85–7.62)
NEUTS SEG NFR BLD AUTO: 36 % (ref 43–75)
NITRITE UR QL STRIP: NEGATIVE
NON-SQ EPI CELLS URNS QL MICRO: ABNORMAL /HPF
NRBC BLD AUTO-RTO: 0 /100 WBCS
PH UR STRIP.AUTO: 5.5 [PH]
PLATELET # BLD AUTO: 221 THOUSANDS/UL (ref 149–390)
PMV BLD AUTO: 11.3 FL (ref 8.9–12.7)
POTASSIUM SERPL-SCNC: 3.6 MMOL/L (ref 3.5–5.3)
POTASSIUM SERPL-SCNC: 3.8 MMOL/L (ref 3.5–5.3)
PROT SERPL-MCNC: 8 G/DL (ref 6.4–8.2)
PROT UR STRIP-MCNC: NEGATIVE MG/DL
RBC # BLD AUTO: 4.54 MILLION/UL (ref 3.81–5.12)
RBC #/AREA URNS AUTO: ABNORMAL /HPF
SODIUM SERPL-SCNC: 129 MMOL/L (ref 136–145)
SODIUM SERPL-SCNC: 141 MMOL/L (ref 136–145)
SP GR UR STRIP.AUTO: 1.01 (ref 1–1.03)
TROPONIN I SERPL-MCNC: <0.02 NG/ML
UROBILINOGEN UR QL STRIP.AUTO: 0.2 E.U./DL
WBC # BLD AUTO: 5.52 THOUSAND/UL (ref 4.31–10.16)
WBC #/AREA URNS AUTO: ABNORMAL /HPF

## 2019-12-22 PROCEDURE — 99284 EMERGENCY DEPT VISIT MOD MDM: CPT

## 2019-12-22 PROCEDURE — 80053 COMPREHEN METABOLIC PANEL: CPT | Performed by: PHYSICIAN ASSISTANT

## 2019-12-22 PROCEDURE — 96361 HYDRATE IV INFUSION ADD-ON: CPT

## 2019-12-22 PROCEDURE — 85025 COMPLETE CBC W/AUTO DIFF WBC: CPT | Performed by: PHYSICIAN ASSISTANT

## 2019-12-22 PROCEDURE — 36415 COLL VENOUS BLD VENIPUNCTURE: CPT | Performed by: PHYSICIAN ASSISTANT

## 2019-12-22 PROCEDURE — 81001 URINALYSIS AUTO W/SCOPE: CPT | Performed by: PHYSICIAN ASSISTANT

## 2019-12-22 PROCEDURE — 84484 ASSAY OF TROPONIN QUANT: CPT | Performed by: PHYSICIAN ASSISTANT

## 2019-12-22 PROCEDURE — 71275 CT ANGIOGRAPHY CHEST: CPT

## 2019-12-22 PROCEDURE — 80048 BASIC METABOLIC PNL TOTAL CA: CPT | Performed by: PHYSICIAN ASSISTANT

## 2019-12-22 PROCEDURE — 74177 CT ABD & PELVIS W/CONTRAST: CPT

## 2019-12-22 PROCEDURE — 96374 THER/PROPH/DIAG INJ IV PUSH: CPT

## 2019-12-22 PROCEDURE — 83690 ASSAY OF LIPASE: CPT | Performed by: PHYSICIAN ASSISTANT

## 2019-12-22 PROCEDURE — 99284 EMERGENCY DEPT VISIT MOD MDM: CPT | Performed by: PHYSICIAN ASSISTANT

## 2019-12-22 RX ORDER — KETOROLAC TROMETHAMINE 30 MG/ML
15 INJECTION, SOLUTION INTRAMUSCULAR; INTRAVENOUS ONCE
Status: COMPLETED | OUTPATIENT
Start: 2019-12-22 | End: 2019-12-22

## 2019-12-22 RX ADMIN — IOHEXOL 100 ML: 350 INJECTION, SOLUTION INTRAVENOUS at 09:31

## 2019-12-22 RX ADMIN — SODIUM CHLORIDE 1000 ML: 0.9 INJECTION, SOLUTION INTRAVENOUS at 08:53

## 2019-12-22 RX ADMIN — KETOROLAC TROMETHAMINE 15 MG: 30 INJECTION, SOLUTION INTRAMUSCULAR at 08:40

## 2019-12-22 NOTE — DISCHARGE INSTRUCTIONS
Take Tylenol 650mg and ibuprofen 600mg every 6 hours as needed for pain  Drink plenty of fluids and stay hydrated

## 2019-12-22 NOTE — ED PROVIDER NOTES
History  Chief Complaint   Patient presents with    Flank Pain     pt c/o left flank pain since this morning  62-year-old female with a history of recent scalp mass removal 1 month ago presenting for evaluation of left flank and upper back pain x1 hour  Patient states the pain woke her up from sleep  No known trauma or inciting incident  The pain is rated as a 10/10 severity  Pain is worse with deep breathing  She has not tried anything OTC for her symptoms  Pain is non-radiating and described as sharp  She is also complaining of shortness of breath  Patient denies fevers, chills, chest pain, nausea, vomiting, diarrhea, constipation, dysuria, hematuria, urinary frequency, peripheral edema, calf tenderness  No prior history of kidney stones  No recent travel or history of blood clots  Previous abdominal surgeries include a hysterectomy  History provided by:  Patient   used: No    Flank Pain   Pain location:  L flank  Pain quality: sharp    Pain radiates to:  Does not radiate  Pain severity:  Severe  Onset quality:  Sudden  Duration:  1 hour  Timing:  Constant  Progression:  Unchanged  Chronicity:  New  Context: previous surgery    Context: not sick contacts, not suspicious food intake and not trauma    Relieved by:  Nothing  Worsened by:  Deep breathing  Ineffective treatments:  None tried  Associated symptoms: shortness of breath    Associated symptoms: no anorexia, no belching, no chest pain, no chills, no constipation, no cough, no diarrhea, no dysuria, no fatigue, no fever, no flatus, no hematuria, no melena, no nausea, no sore throat, no vaginal bleeding, no vaginal discharge and no vomiting        Prior to Admission Medications   Prescriptions Last Dose Informant Patient Reported? Taking?    albuterol (PROVENTIL HFA,VENTOLIN HFA) 90 mcg/act inhaler More than a month at Unknown time  No No   Sig: Inhale 2 puffs every 4 (four) hours as needed for wheezing   albuterol (PROVENTIL HFA,VENTOLIN HFA) 90 mcg/act inhaler Not Taking at Unknown time  No No   Sig: Inhale 2 puffs every 4 (four) hours as needed for wheezing   Patient not taking: Reported on 2019   benzocaine (HURRICAINE) 20 % More than a month at Unknown time  No No   Si application by Mucosal route 3 (three) times a day as needed for mucositis   benzonatate (TESSALON PERLES) 100 mg capsule Not Taking at Unknown time  No No   Sig: Take 1 capsule by mouth every 8 (eight) hours   Patient not taking: Reported on 2019   benzonatate (TESSALON PERLES) 100 mg capsule Not Taking at Unknown time  No No   Sig: Take 1 capsule (100 mg total) by mouth every 8 (eight) hours   Patient not taking: Reported on 2019   cyclobenzaprine (FLEXERIL) 10 mg tablet Not Taking at Unknown time  No No   Sig: Take 1 tablet (10 mg total) by mouth 3 (three) times a day as needed for muscle spasms   Patient not taking: Reported on 2019   dicyclomine (BENTYL) 20 mg tablet Unknown at Unknown time  No No   Sig: Take 1 tablet every 6 hours for abdominal cramping   ibuprofen (MOTRIN) 600 mg tablet   No No   Sig: Take 1 tablet by mouth every 6 (six) hours as needed for mild pain   ondansetron (ZOFRAN) 4 mg tablet Unknown at Unknown time  No No   Sig: Take 1 tablet by mouth every 6 (six) hours   valACYclovir (VALTREX) 500 mg tablet Past Week at Unknown time  No Yes   Sig: Take 1 tablet by mouth 2 (two) times a day for 3 days      Facility-Administered Medications: None       History reviewed  No pertinent past medical history  Past Surgical History:   Procedure Laterality Date    HYSTERECTOMY         History reviewed  No pertinent family history  I have reviewed and agree with the history as documented      Social History     Tobacco Use    Smoking status: Current Every Day Smoker     Packs/day: 0 20    Smokeless tobacco: Never Used   Substance Use Topics    Alcohol use: Yes     Comment: occ    Drug use: No        Review of Systems Constitutional: Negative for chills, diaphoresis, fatigue and fever  HENT: Negative for congestion and sore throat  Respiratory: Positive for shortness of breath  Negative for cough and wheezing  Cardiovascular: Negative for chest pain and leg swelling  Gastrointestinal: Negative for abdominal pain, anorexia, constipation, diarrhea, flatus, melena, nausea and vomiting  Genitourinary: Positive for flank pain  Negative for dysuria, hematuria, vaginal bleeding and vaginal discharge  Skin: Negative for color change and rash  All other systems reviewed and are negative  Physical Exam  Physical Exam   Constitutional: She appears well-developed and well-nourished  No distress  Nontoxic but appears uncomfortable   HENT:   Head: Normocephalic and atraumatic  Right Ear: External ear normal    Left Ear: External ear normal    Nose: Nose normal    Mouth/Throat: Oropharynx is clear and moist    Eyes: Conjunctivae are normal  Right eye exhibits no discharge  Left eye exhibits no discharge  No scleral icterus  Neck: Normal range of motion  Neck supple  Cardiovascular: Normal rate, regular rhythm and normal heart sounds  No murmur heard  Pulmonary/Chest: Effort normal and breath sounds normal  No stridor  No respiratory distress  She has no wheezes  She has no rales  She exhibits no tenderness  No posterior chest wall pain on palpation    Abdominal: Soft  Bowel sounds are normal  She exhibits no distension  There is no tenderness  There is no guarding and no CVA tenderness  No CVA tenderness   Musculoskeletal: Normal range of motion  She exhibits no edema or deformity  No peripheral edema or calf tenderness   Lymphadenopathy:     She has no cervical adenopathy  Neurological: She is alert  She is not disoriented  GCS eye subscore is 4  GCS verbal subscore is 5  GCS motor subscore is 6  Skin: Skin is warm and dry  She is not diaphoretic  Psychiatric: She has a normal mood and affect   Her behavior is normal    Nursing note and vitals reviewed        Vital Signs  ED Triage Vitals [12/22/19 0758]   Temperature Pulse Respirations Blood Pressure SpO2   97 8 °F (36 6 °C) 93 18 135/92 99 %      Temp Source Heart Rate Source Patient Position - Orthostatic VS BP Location FiO2 (%)   Oral Monitor Sitting Right arm --      Pain Score       Worst Possible Pain           Vitals:    12/22/19 0758 12/22/19 0957   BP: 135/92 133/80   Pulse: 93 77   Patient Position - Orthostatic VS: Sitting          Visual Acuity      ED Medications  Medications   ketorolac (TORADOL) injection 15 mg (15 mg Intravenous Given 12/22/19 0840)   sodium chloride 0 9 % bolus 1,000 mL (0 mL Intravenous Stopped 12/22/19 1040)   iohexol (OMNIPAQUE) 350 MG/ML injection (MULTI-DOSE) 100 mL (100 mL Intravenous Given 12/22/19 0931)       Diagnostic Studies  Results Reviewed     Procedure Component Value Units Date/Time    Basic metabolic panel [[id number]]  (Abnormal) Collected:  12/22/19 1030    Lab Status:  Final result Specimen:  Blood from Arm, Right Updated:  12/22/19 1147     Sodium 141 mmol/L      Potassium 3 8 mmol/L      Chloride 106 mmol/L      CO2 27 mmol/L      ANION GAP 8 mmol/L      BUN 16 mg/dL      Creatinine 0 65 mg/dL      Glucose 110 mg/dL      Calcium 8 1 mg/dL      eGFR 120 ml/min/1 73sq m     Narrative:       [facility] guidelines for Chronic Kidney Disease (CKD):     Stage 1 with normal or high GFR (GFR > 90 mL/min/1 73 square meters)    Stage 2 Mild CKD (GFR = 60-89 mL/min/1 73 square meters)    Stage 3A Moderate CKD (GFR = 45-59 mL/min/1 73 square meters)    Stage 3B Moderate CKD (GFR = 30-44 mL/min/1 73 square meters)    Stage 4 Severe CKD (GFR = 15-29 mL/min/1 73 square meters)    Stage 5 End Stage CKD (GFR <15 mL/min/1 73 square meters)  Note: GFR calculation is accurate only with a steady state creatinine    Urine Microscopic [[id number]]  (Abnormal) Collected:  12/22/19 0954    Lab Status: Final result Specimen:  Urine, Clean Catch Updated:  12/22/19 1100     RBC, UA 0-1 /hpf      WBC, UA None Seen /hpf      Epithelial Cells None Seen /hpf      Bacteria, UA None Seen /hpf     UA w Reflex to Microscopic w Reflex to Culture [[id number]]  (Abnormal) Collected:  12/22/19 0954    Lab Status:  Final result Specimen:  Urine, Clean Catch Updated:  12/22/19 1039     Color, UA Yellow     Clarity, UA Clear     Specific Gravity, UA 1 015     pH, UA 5 5     Leukocytes, UA Negative     Nitrite, UA Negative     Protein, UA Negative mg/dl      Glucose, UA Negative mg/dl      Ketones, UA Negative mg/dl      Urobilinogen, UA 0 2 E U /dl      Bilirubin, UA Negative     Blood, UA Trace-Intact    Comprehensive metabolic panel [[id number]]  (Abnormal) Collected:  12/22/19 0823    Lab Status:  Final result Specimen:  Blood from Arm, Right Updated:  12/22/19 0853     Sodium 129 mmol/L      Potassium 3 6 mmol/L      Chloride 100 mmol/L      CO2 28 mmol/L      ANION GAP 1 mmol/L      BUN 18 mg/dL      Creatinine 0 64 mg/dL      Glucose 100 mg/dL      Calcium 9 0 mg/dL      AST 17 U/L      ALT 24 U/L      Alkaline Phosphatase 115 U/L      Total Protein 8 0 g/dL      Albumin 3 7 g/dL      Total Bilirubin 0 30 mg/dL      eGFR 120 ml/min/1 73sq m     Narrative:       [place] guidelines for Chronic Kidney Disease (CKD):     Stage 1 with normal or high GFR (GFR > 90 mL/min/1 73 square meters)    Stage 2 Mild CKD (GFR = 60-89 mL/min/1 73 square meters)    Stage 3A Moderate CKD (GFR = 45-59 mL/min/1 73 square meters)    Stage 3B Moderate CKD (GFR = 30-44 mL/min/1 73 square meters)    Stage 4 Severe CKD (GFR = 15-29 mL/min/1 73 square meters)    Stage 5 End Stage CKD (GFR <15 mL/min/1 73 square meters)  Note: GFR calculation is accurate only with a steady state creatinine    Lipase [[id number]]  (Abnormal) Collected:  12/22/19 0823    Lab Status:  Final result Specimen:  Blood from Arm, Right Updated: 12/22/19 0853     Lipase 69 u/L     Troponin I [[id number]]  (Normal) Collected:  12/22/19 0823    Lab Status:  Final result Specimen:  Blood from Arm, Right Updated:  12/22/19 0850     Troponin I <0 02 ng/mL     CBC and differential [[id number]]  (Abnormal) Collected:  12/22/19 0823    Lab Status:  Final result Specimen:  Blood from Arm, Right Updated:  12/22/19 0832     WBC 5 52 Thousand/uL      RBC 4 54 Million/uL      Hemoglobin 12 5 g/dL      Hematocrit 39 1 %      MCV 86 fL      MCH 27 5 pg      MCHC 32 0 g/dL      RDW 14 5 %      MPV 11 3 fL      Platelets 312 Thousands/uL      nRBC 0 /100 WBCs      Neutrophils Relative 36 %      Immat GRANS % 0 %      Lymphocytes Relative 48 %      Monocytes Relative 10 %      Eosinophils Relative 5 %      Basophils Relative 1 %      Neutrophils Absolute 2 01 Thousands/µL      Immature Grans Absolute 0 01 Thousand/uL      Lymphocytes Absolute 2 64 Thousands/µL      Monocytes Absolute 0 53 Thousand/µL      Eosinophils Absolute 0 28 Thousand/µL      Basophils Absolute 0 05 Thousands/µL                  PE Study with CT Abdomen and Pelvis with contrast   Final Result by Blas Chapin MD (12/22 6743)      No pulmonary embolism or any other acute abnormality in the chest, abdomen, or pelvis        Workstation performed: [id number]                    Procedures  ECG 12 Lead Documentation Only  Date/Time: 12/22/2019 9:10 AM  Performed by: Austen Bright PA-C  Authorized by: Austen Bright PA-C     Indications / Diagnosis:  Flank pain, SOB  ECG reviewed by me, the ED Provider: yes    Patient location:  ED  Previous ECG:     Previous ECG:  Unavailable    Comparison to cardiac monitor: Yes    Interpretation:     Interpretation: normal    Rate:     ECG rate:  71    ECG rate assessment: normal    Rhythm:     Rhythm: sinus rhythm    Ectopy:     Ectopy: none    QRS:     QRS axis:  Normal    QRS intervals:  Normal  Conduction:     Conduction: normal    ST segments:     ST segments: Normal  T waves:     T waves: flattening      Flattening:  AVL             ED Course  ED Course as of Dec 22 1454   Sun Dec 22, 2019   1024 Sodium is 129  Sodium was normal on her previous lab work  No symptoms of hyponatremia  Will give 1L fluid bolus and recheck BMP  Shyla Lee' Criteria for PE      Most Recent Value   Wells' Criteria for PE   Clinical signs and symptoms of DVT  0 Filed at: 12/22/2019 0808   PE is primary diagnosis or equally likely  0 Filed at: 12/22/2019 0808   HR >100  0 Filed at: 12/22/2019 7508   Immobilization at least 3 days or Surgery in the previous 4 weeks  1 5 Filed at: 12/22/2019 8894   Previous, objectively diagnosed PE or DVT  0 Filed at: 12/22/2019 3901   Hemoptysis  0 Filed at: 12/22/2019 8449   Malignancy with treatment within 6 months or palliative  0 Filed at: 12/22/2019 1790   Wells' Criteria Total  1 5 Filed at: 12/22/2019 6928            MDM  Number of Diagnoses or Management Options  Flank pain: new and requires workup  Hyponatremia: new and requires workup  Diagnosis management comments: 51yo female presenting for evaluation of left flank pain and upper back pain x 1 hour  No trauma or inciting incident  Pain is not reproducible on palpation  History of a recent scalp mass removal procedure 1 month ago  Differential diagnosis includes but is not limited to: pneumonia, pneumothorax, PE, kidney stone, UTI, pyelonephritis, musculoskeletal, diverticulitis, other intra-abdominal abnormality  Initial ED plan: Check abdominal labs, troponin, EKG, and UA  Will check CTA chest/abdomen/pelvis for further evaluation  Toradol and fluid bolus for symptom relief  Final assessment: Labs significant for hyponatremia with sodium of 129  No previous history of hyponatremia  On re-evaluation, she denies nausea, vomiting, weakness and has no symptoms related to hyponatremia  Remainder of labs normal  Patient given 1L normal saline and BMP rechecked  Sodium improved to 141  Hyponatremia likely due to hypovolemia  CT negative for acute findings  Etiology of symptoms unclear  Pain is improved with Toradol  Advised Tylenol and ibuprofen as needed for pain  Advised close PCP follow-up  ED return precautions discussed  Patient expressed understanding and is agreeable to plan  Patient discharged in stable condition  Amount and/or Complexity of Data Reviewed  Clinical lab tests: ordered and reviewed  Tests in the radiology section of CPT®: ordered and reviewed  Review and summarize past medical records: yes  Independent visualization of images, tracings, or specimens: yes    Risk of Complications, Morbidity, and/or Mortality  Presenting problems: high  Diagnostic procedures: high  Management options: high    Patient Progress  Patient progress: improved        Disposition  Final diagnoses:   Flank pain   Hyponatremia     Time reflects when diagnosis was documented in both MDM as applicable and the Disposition within this note     Time User Action Codes Description Comment    12/22/2019 12:02  [employer], [place] [R10 9] Flank pain     12/22/2019 12:02  [employer], [place] [E87 1] Hyponatremia       ED Disposition     ED Disposition Condition Date/Time Comment    Discharge Stable Sun Dec 22, 2019 12:02  [facility] discharge to home/self care              Follow-up Information     Follow up With Specialties Details Why Contact Info Additional Information    Lisbeth Hernandez MD Family Medicine Schedule an appointment as soon as possible for a visit   Prud[address] [place] [id number] [phone]       [address] [facility] Emergency Department Emergency Medicine  If symptoms worsen [address] [facility] [address] ED[address] [place], [place], [address]          Discharge Medication List as of 12/22/2019 12:02 PM      CONTINUE these medications which have NOT CHANGED    Details   valACYclovir (VALTREX) 500 mg tablet Take 1 tablet by mouth 2 (two) times a day for 3 days, Starting Sat 1/6/2018, Until Sun 12/22/2019, Print      !! albuterol (PROVENTIL HFA,VENTOLIN HFA) 90 mcg/act inhaler Inhale 2 puffs every 4 (four) hours as needed for wheezing, Starting Fri 12/8/2017, Print      !! albuterol (PROVENTIL HFA,VENTOLIN HFA) 90 mcg/act inhaler Inhale 2 puffs every 4 (four) hours as needed for wheezing, Starting Mon 3/25/2019, Print      benzocaine (HURRICAINE) 20 % 1 application by Mucosal route 3 (three) times a day as needed for mucositis, Starting Sat 11/11/2017, Print      !! benzonatate (TESSALON PERLES) 100 mg capsule Take 1 capsule by mouth every 8 (eight) hours, Starting Sat 12/9/2017, Print      !! benzonatate (TESSALON PERLES) 100 mg capsule Take 1 capsule (100 mg total) by mouth every 8 (eight) hours, Starting Mon 3/25/2019, Print      cyclobenzaprine (FLEXERIL) 10 mg tablet Take 1 tablet (10 mg total) by mouth 3 (three) times a day as needed for muscle spasms, Starting Tue 3/27/2018, Print      dicyclomine (BENTYL) 20 mg tablet Take 1 tablet every 6 hours for abdominal cramping, Print      ibuprofen (MOTRIN) 600 mg tablet Take 1 tablet by mouth every 6 (six) hours as needed for mild pain, Starting Sat 11/11/2017, Print      ondansetron (ZOFRAN) 4 mg tablet Take 1 tablet by mouth every 6 (six) hours, Starting Sat 1/6/2018, Print       !! - Potential duplicate medications found  Please discuss with provider  No discharge procedures on file      ED Provider  Electronically Signed by           Jossy Prieto PA-C  12/22/19 2535

## 2019-12-22 NOTE — ED NOTES
Patient had ordered food earlier, is waiting until it arrives to leave   Also has to wait for her son to return     Ab Lima RN  12/22/19 3034

## 2020-05-17 ENCOUNTER — HOSPITAL ENCOUNTER (EMERGENCY)
Facility: HOSPITAL | Age: 50
Discharge: HOME | End: 2020-05-17
Attending: EMERGENCY MEDICINE | Admitting: EMERGENCY MEDICINE
Payer: COMMERCIAL

## 2020-05-17 VITALS
TEMPERATURE: 98.2 F | RESPIRATION RATE: 17 BRPM | OXYGEN SATURATION: 100 % | WEIGHT: 165.79 LBS | HEART RATE: 89 BPM | DIASTOLIC BLOOD PRESSURE: 99 MMHG | SYSTOLIC BLOOD PRESSURE: 130 MMHG | BODY MASS INDEX: 28.46 KG/M2

## 2020-05-17 DIAGNOSIS — L02.31 ABSCESS OF GLUTEAL CLEFT: Primary | ICD-10-CM

## 2020-05-17 DIAGNOSIS — L03.317 CELLULITIS, GLUTEAL, LEFT: ICD-10-CM

## 2020-05-17 PROCEDURE — 10060 I&D ABSCESS SIMPLE/SINGLE: CPT | Performed by: EMERGENCY MEDICINE

## 2020-05-17 PROCEDURE — 99284 EMERGENCY DEPT VISIT MOD MDM: CPT | Performed by: EMERGENCY MEDICINE

## 2020-05-17 PROCEDURE — 99283 EMERGENCY DEPT VISIT LOW MDM: CPT

## 2020-05-17 RX ORDER — CEPHALEXIN 250 MG/1
500 CAPSULE ORAL ONCE
Status: COMPLETED | OUTPATIENT
Start: 2020-05-17 | End: 2020-05-17

## 2020-05-17 RX ORDER — SULFAMETHOXAZOLE AND TRIMETHOPRIM 800; 160 MG/1; MG/1
1 TABLET ORAL 2 TIMES DAILY
Qty: 14 TABLET | Refills: 0 | Status: SHIPPED | OUTPATIENT
Start: 2020-05-17 | End: 2020-05-24

## 2020-05-17 RX ORDER — CEPHALEXIN 500 MG/1
500 CAPSULE ORAL EVERY 6 HOURS SCHEDULED
Qty: 28 CAPSULE | Refills: 0 | Status: SHIPPED | OUTPATIENT
Start: 2020-05-17 | End: 2020-05-24

## 2020-05-17 RX ORDER — SULFAMETHOXAZOLE AND TRIMETHOPRIM 800; 160 MG/1; MG/1
1 TABLET ORAL ONCE
Status: COMPLETED | OUTPATIENT
Start: 2020-05-17 | End: 2020-05-17

## 2020-05-17 RX ORDER — HYDROCODONE BITARTRATE AND ACETAMINOPHEN 5; 325 MG/1; MG/1
1 TABLET ORAL ONCE
Status: COMPLETED | OUTPATIENT
Start: 2020-05-17 | End: 2020-05-17

## 2020-05-17 RX ORDER — LIDOCAINE HYDROCHLORIDE AND EPINEPHRINE 10; 10 MG/ML; UG/ML
10 INJECTION, SOLUTION INFILTRATION; PERINEURAL ONCE
Status: COMPLETED | OUTPATIENT
Start: 2020-05-17 | End: 2020-05-17

## 2020-05-17 RX ORDER — HYDROCODONE BITARTRATE AND ACETAMINOPHEN 5; 325 MG/1; MG/1
1 TABLET ORAL EVERY 6 HOURS PRN
Qty: 6 TABLET | Refills: 0 | Status: SHIPPED | OUTPATIENT
Start: 2020-05-17 | End: 2020-05-27

## 2020-05-17 RX ADMIN — SULFAMETHOXAZOLE AND TRIMETHOPRIM 1 TABLET: 800; 160 TABLET ORAL at 13:20

## 2020-05-17 RX ADMIN — HYDROCODONE BITARTRATE AND ACETAMINOPHEN 1 TABLET: 5; 325 TABLET ORAL at 13:20

## 2020-05-17 RX ADMIN — CEPHALEXIN 500 MG: 250 CAPSULE ORAL at 13:20

## 2020-05-17 RX ADMIN — LIDOCAINE HYDROCHLORIDE,EPINEPHRINE BITARTRATE 10 ML: 10; .01 INJECTION, SOLUTION INFILTRATION; PERINEURAL at 13:19

## 2020-05-20 ENCOUNTER — OFFICE VISIT (OUTPATIENT)
Dept: SURGERY | Facility: CLINIC | Age: 50
End: 2020-05-20
Payer: COMMERCIAL

## 2020-05-20 VITALS
WEIGHT: 160.8 LBS | HEART RATE: 85 BPM | HEIGHT: 64 IN | TEMPERATURE: 98.5 F | DIASTOLIC BLOOD PRESSURE: 72 MMHG | BODY MASS INDEX: 27.45 KG/M2 | SYSTOLIC BLOOD PRESSURE: 122 MMHG

## 2020-05-20 DIAGNOSIS — L05.01 PILONIDAL CYST WITH ABSCESS: Primary | ICD-10-CM

## 2020-05-20 PROCEDURE — 99203 OFFICE O/P NEW LOW 30 MIN: CPT | Performed by: SURGERY

## 2020-05-20 RX ORDER — CHLORHEXIDINE GLUCONATE 4 G/100ML
SOLUTION TOPICAL DAILY PRN
Status: CANCELLED | OUTPATIENT
Start: 2020-05-20

## 2020-05-21 ENCOUNTER — CLINICAL SUPPORT (OUTPATIENT)
Dept: URGENT CARE | Facility: CLINIC | Age: 50
End: 2020-05-21
Payer: COMMERCIAL

## 2020-05-21 ENCOUNTER — LAB (OUTPATIENT)
Dept: LAB | Facility: CLINIC | Age: 50
End: 2020-05-21
Payer: COMMERCIAL

## 2020-05-21 DIAGNOSIS — L05.01 PILONIDAL CYST WITH ABSCESS: ICD-10-CM

## 2020-05-21 LAB
ANION GAP SERPL CALCULATED.3IONS-SCNC: 7 MMOL/L (ref 4–13)
ATRIAL RATE: 69 BPM
BASOPHILS # BLD AUTO: 0.05 THOUSANDS/ΜL (ref 0–0.1)
BASOPHILS NFR BLD AUTO: 1 % (ref 0–1)
BUN SERPL-MCNC: 16 MG/DL (ref 5–25)
CALCIUM SERPL-MCNC: 8.8 MG/DL (ref 8.3–10.1)
CHLORIDE SERPL-SCNC: 106 MMOL/L (ref 100–108)
CO2 SERPL-SCNC: 24 MMOL/L (ref 21–32)
CREAT SERPL-MCNC: 0.8 MG/DL (ref 0.6–1.3)
EOSINOPHIL # BLD AUTO: 0.19 THOUSAND/ΜL (ref 0–0.61)
EOSINOPHIL NFR BLD AUTO: 4 % (ref 0–6)
ERYTHROCYTE [DISTWIDTH] IN BLOOD BY AUTOMATED COUNT: 14.9 % (ref 11.6–15.1)
GFR SERPL CREATININE-BSD FRML MDRD: 99 ML/MIN/1.73SQ M
GLUCOSE P FAST SERPL-MCNC: 96 MG/DL (ref 65–99)
HCT VFR BLD AUTO: 40.8 % (ref 34.8–46.1)
HGB BLD-MCNC: 13.1 G/DL (ref 11.5–15.4)
IMM GRANULOCYTES # BLD AUTO: 0.02 THOUSAND/UL (ref 0–0.2)
IMM GRANULOCYTES NFR BLD AUTO: 0 % (ref 0–2)
LYMPHOCYTES # BLD AUTO: 2.01 THOUSANDS/ΜL (ref 0.6–4.47)
LYMPHOCYTES NFR BLD AUTO: 39 % (ref 14–44)
MCH RBC QN AUTO: 28.9 PG (ref 26.8–34.3)
MCHC RBC AUTO-ENTMCNC: 32.1 G/DL (ref 31.4–37.4)
MCV RBC AUTO: 90 FL (ref 82–98)
MONOCYTES # BLD AUTO: 0.45 THOUSAND/ΜL (ref 0.17–1.22)
MONOCYTES NFR BLD AUTO: 9 % (ref 4–12)
NEUTROPHILS # BLD AUTO: 2.43 THOUSANDS/ΜL (ref 1.85–7.62)
NEUTS SEG NFR BLD AUTO: 47 % (ref 43–75)
NRBC BLD AUTO-RTO: 0 /100 WBCS
P AXIS: 17 DEGREES
PLATELET # BLD AUTO: 226 THOUSANDS/UL (ref 149–390)
PMV BLD AUTO: 12.3 FL (ref 8.9–12.7)
POTASSIUM SERPL-SCNC: 3.7 MMOL/L (ref 3.5–5.3)
PR INTERVAL: 136 MS
QRS AXIS: 30 DEGREES
QRSD INTERVAL: 80 MS
QT INTERVAL: 396 MS
QTC INTERVAL: 424 MS
RBC # BLD AUTO: 4.54 MILLION/UL (ref 3.81–5.12)
SODIUM SERPL-SCNC: 137 MMOL/L (ref 136–145)
T WAVE AXIS: 43 DEGREES
VENTRICULAR RATE: 69 BPM
WBC # BLD AUTO: 5.15 THOUSAND/UL (ref 4.31–10.16)

## 2020-05-21 PROCEDURE — 80048 BASIC METABOLIC PNL TOTAL CA: CPT

## 2020-05-21 PROCEDURE — 85025 COMPLETE CBC W/AUTO DIFF WBC: CPT

## 2020-05-21 PROCEDURE — 93010 ELECTROCARDIOGRAM REPORT: CPT | Performed by: INTERNAL MEDICINE

## 2020-05-21 PROCEDURE — 36415 COLL VENOUS BLD VENIPUNCTURE: CPT

## 2020-05-21 PROCEDURE — U0003 INFECTIOUS AGENT DETECTION BY NUCLEIC ACID (DNA OR RNA); SEVERE ACUTE RESPIRATORY SYNDROME CORONAVIRUS 2 (SARS-COV-2) (CORONAVIRUS DISEASE [COVID-19]), AMPLIFIED PROBE TECHNIQUE, MAKING USE OF HIGH THROUGHPUT TECHNOLOGIES AS DESCRIBED BY CMS-2020-01-R: HCPCS

## 2020-05-21 PROCEDURE — 93005 ELECTROCARDIOGRAM TRACING: CPT

## 2020-05-23 LAB — SARS-COV-2 RNA SPEC QL NAA+PROBE: NOT DETECTED

## 2020-05-27 ENCOUNTER — HOSPITAL ENCOUNTER (OUTPATIENT)
Facility: HOSPITAL | Age: 50
Setting detail: OUTPATIENT SURGERY
Discharge: HOME | End: 2020-05-27
Attending: SURGERY | Admitting: SURGERY
Payer: COMMERCIAL

## 2020-05-27 ENCOUNTER — TELEPHONE (OUTPATIENT)
Dept: SURGERY | Facility: CLINIC | Age: 50
End: 2020-05-27

## 2020-05-27 ENCOUNTER — ANESTHESIA (OUTPATIENT)
Dept: PERIOP | Facility: HOSPITAL | Age: 50
End: 2020-05-27
Payer: COMMERCIAL

## 2020-05-27 ENCOUNTER — ANESTHESIA EVENT (OUTPATIENT)
Dept: PERIOP | Facility: HOSPITAL | Age: 50
End: 2020-05-27
Payer: COMMERCIAL

## 2020-05-27 VITALS
WEIGHT: 159.39 LBS | OXYGEN SATURATION: 97 % | RESPIRATION RATE: 18 BRPM | SYSTOLIC BLOOD PRESSURE: 108 MMHG | TEMPERATURE: 97.2 F | HEART RATE: 61 BPM | DIASTOLIC BLOOD PRESSURE: 56 MMHG | BODY MASS INDEX: 27.21 KG/M2 | HEIGHT: 64 IN

## 2020-05-27 DIAGNOSIS — L05.01 PILONIDAL CYST WITH ABSCESS: ICD-10-CM

## 2020-05-27 PROCEDURE — 11770 EXC PILONIDAL CYST SIMPLE: CPT | Performed by: SURGERY

## 2020-05-27 PROCEDURE — 88304 TISSUE EXAM BY PATHOLOGIST: CPT | Performed by: PATHOLOGY

## 2020-05-27 RX ORDER — DEXAMETHASONE SODIUM PHOSPHATE 10 MG/ML
INJECTION, SOLUTION INTRAMUSCULAR; INTRAVENOUS AS NEEDED
Status: DISCONTINUED | OUTPATIENT
Start: 2020-05-27 | End: 2020-05-27 | Stop reason: SURG

## 2020-05-27 RX ORDER — ALBUTEROL SULFATE 2.5 MG/3ML
2.5 SOLUTION RESPIRATORY (INHALATION) ONCE AS NEEDED
Status: COMPLETED | OUTPATIENT
Start: 2020-05-27 | End: 2020-05-27

## 2020-05-27 RX ORDER — FENTANYL CITRATE 50 UG/ML
INJECTION, SOLUTION INTRAMUSCULAR; INTRAVENOUS AS NEEDED
Status: DISCONTINUED | OUTPATIENT
Start: 2020-05-27 | End: 2020-05-27 | Stop reason: SURG

## 2020-05-27 RX ORDER — HYDROMORPHONE HCL/PF 1 MG/ML
0.4 SYRINGE (ML) INJECTION
Status: DISCONTINUED | OUTPATIENT
Start: 2020-05-27 | End: 2020-05-27 | Stop reason: HOSPADM

## 2020-05-27 RX ORDER — ONDANSETRON 2 MG/ML
4 INJECTION INTRAMUSCULAR; INTRAVENOUS ONCE AS NEEDED
Status: DISCONTINUED | OUTPATIENT
Start: 2020-05-27 | End: 2020-05-27 | Stop reason: HOSPADM

## 2020-05-27 RX ORDER — SODIUM CHLORIDE, SODIUM LACTATE, POTASSIUM CHLORIDE, CALCIUM CHLORIDE 600; 310; 30; 20 MG/100ML; MG/100ML; MG/100ML; MG/100ML
50 INJECTION, SOLUTION INTRAVENOUS CONTINUOUS
Status: CANCELLED | OUTPATIENT
Start: 2020-05-27

## 2020-05-27 RX ORDER — PROPOFOL 10 MG/ML
INJECTION, EMULSION INTRAVENOUS AS NEEDED
Status: DISCONTINUED | OUTPATIENT
Start: 2020-05-27 | End: 2020-05-27 | Stop reason: SURG

## 2020-05-27 RX ORDER — METOCLOPRAMIDE HYDROCHLORIDE 5 MG/ML
10 INJECTION INTRAMUSCULAR; INTRAVENOUS ONCE AS NEEDED
Status: DISCONTINUED | OUTPATIENT
Start: 2020-05-27 | End: 2020-05-27 | Stop reason: HOSPADM

## 2020-05-27 RX ORDER — ONDANSETRON 2 MG/ML
INJECTION INTRAMUSCULAR; INTRAVENOUS AS NEEDED
Status: DISCONTINUED | OUTPATIENT
Start: 2020-05-27 | End: 2020-05-27 | Stop reason: SURG

## 2020-05-27 RX ORDER — TRAMADOL HYDROCHLORIDE 50 MG/1
50 TABLET ORAL EVERY 6 HOURS PRN
Status: DISCONTINUED | OUTPATIENT
Start: 2020-05-27 | End: 2020-05-27 | Stop reason: HOSPADM

## 2020-05-27 RX ORDER — FENTANYL CITRATE/PF 50 MCG/ML
50 SYRINGE (ML) INJECTION
Status: DISCONTINUED | OUTPATIENT
Start: 2020-05-27 | End: 2020-05-27 | Stop reason: HOSPADM

## 2020-05-27 RX ORDER — KETAMINE HCL IN NACL, ISO-OSM 100MG/10ML
SYRINGE (ML) INJECTION AS NEEDED
Status: DISCONTINUED | OUTPATIENT
Start: 2020-05-27 | End: 2020-05-27 | Stop reason: SURG

## 2020-05-27 RX ORDER — LIDOCAINE HYDROCHLORIDE 10 MG/ML
INJECTION, SOLUTION EPIDURAL; INFILTRATION; INTRACAUDAL; PERINEURAL AS NEEDED
Status: DISCONTINUED | OUTPATIENT
Start: 2020-05-27 | End: 2020-05-27 | Stop reason: SURG

## 2020-05-27 RX ORDER — CHLORHEXIDINE GLUCONATE 4 G/100ML
SOLUTION TOPICAL DAILY PRN
Status: DISCONTINUED | OUTPATIENT
Start: 2020-05-27 | End: 2020-05-27 | Stop reason: HOSPADM

## 2020-05-27 RX ORDER — PROMETHAZINE HYDROCHLORIDE 25 MG/ML
12.5 INJECTION, SOLUTION INTRAMUSCULAR; INTRAVENOUS ONCE AS NEEDED
Status: DISCONTINUED | OUTPATIENT
Start: 2020-05-27 | End: 2020-05-27 | Stop reason: HOSPADM

## 2020-05-27 RX ORDER — SODIUM CHLORIDE, SODIUM LACTATE, POTASSIUM CHLORIDE, CALCIUM CHLORIDE 600; 310; 30; 20 MG/100ML; MG/100ML; MG/100ML; MG/100ML
INJECTION, SOLUTION INTRAVENOUS CONTINUOUS PRN
Status: DISCONTINUED | OUTPATIENT
Start: 2020-05-27 | End: 2020-05-27 | Stop reason: SURG

## 2020-05-27 RX ORDER — CEFAZOLIN SODIUM 2 G/50ML
2000 SOLUTION INTRAVENOUS ONCE
Status: COMPLETED | OUTPATIENT
Start: 2020-05-27 | End: 2020-05-27

## 2020-05-27 RX ORDER — MIDAZOLAM HYDROCHLORIDE 2 MG/2ML
INJECTION, SOLUTION INTRAMUSCULAR; INTRAVENOUS AS NEEDED
Status: DISCONTINUED | OUTPATIENT
Start: 2020-05-27 | End: 2020-05-27 | Stop reason: SURG

## 2020-05-27 RX ORDER — SUCCINYLCHOLINE/SOD CL,ISO/PF 100 MG/5ML
SYRINGE (ML) INTRAVENOUS AS NEEDED
Status: DISCONTINUED | OUTPATIENT
Start: 2020-05-27 | End: 2020-05-27 | Stop reason: SURG

## 2020-05-27 RX ORDER — ACETAMINOPHEN AND CODEINE PHOSPHATE 300; 30 MG/1; MG/1
1 TABLET ORAL EVERY 6 HOURS PRN
Qty: 20 TABLET | Refills: 0 | Status: SHIPPED | OUTPATIENT
Start: 2020-05-27 | End: 2020-06-06

## 2020-05-27 RX ORDER — ACETAMINOPHEN 325 MG/1
975 TABLET ORAL EVERY 8 HOURS PRN
Status: DISCONTINUED | OUTPATIENT
Start: 2020-05-27 | End: 2020-05-27 | Stop reason: HOSPADM

## 2020-05-27 RX ADMIN — ONDANSETRON 4 MG: 2 INJECTION INTRAMUSCULAR; INTRAVENOUS at 13:08

## 2020-05-27 RX ADMIN — PROPOFOL 180 MG: 10 INJECTION, EMULSION INTRAVENOUS at 13:14

## 2020-05-27 RX ADMIN — FENTANYL CITRATE 50 MCG: 50 INJECTION, SOLUTION INTRAMUSCULAR; INTRAVENOUS at 13:14

## 2020-05-27 RX ADMIN — MIDAZOLAM HYDROCHLORIDE 2 MG: 1 INJECTION, SOLUTION INTRAMUSCULAR; INTRAVENOUS at 13:08

## 2020-05-27 RX ADMIN — Medication 20 MG: at 13:21

## 2020-05-27 RX ADMIN — DEXAMETHASONE SODIUM PHOSPHATE 4 MG: 10 INJECTION, SOLUTION INTRAMUSCULAR; INTRAVENOUS at 13:23

## 2020-05-27 RX ADMIN — ALBUTEROL SULFATE 2.5 MG: 2.5 SOLUTION RESPIRATORY (INHALATION) at 13:53

## 2020-05-27 RX ADMIN — FENTANYL CITRATE 50 MCG: 50 INJECTION INTRAMUSCULAR; INTRAVENOUS at 14:05

## 2020-05-27 RX ADMIN — CEFAZOLIN SODIUM 2000 MG: 2 SOLUTION INTRAVENOUS at 13:08

## 2020-05-27 RX ADMIN — LIDOCAINE HYDROCHLORIDE 50 MG: 10 INJECTION, SOLUTION EPIDURAL; INFILTRATION; INTRACAUDAL; PERINEURAL at 13:14

## 2020-05-27 RX ADMIN — Medication 100 MG: at 13:14

## 2020-05-27 RX ADMIN — SODIUM CHLORIDE, SODIUM LACTATE, POTASSIUM CHLORIDE, AND CALCIUM CHLORIDE: .6; .31; .03; .02 INJECTION, SOLUTION INTRAVENOUS at 13:06

## 2020-05-27 RX ADMIN — FENTANYL CITRATE 50 MCG: 50 INJECTION, SOLUTION INTRAMUSCULAR; INTRAVENOUS at 13:29

## 2020-05-27 RX ADMIN — FENTANYL CITRATE 50 MCG: 50 INJECTION INTRAMUSCULAR; INTRAVENOUS at 14:10

## 2020-05-27 RX ADMIN — HYDROMORPHONE HYDROCHLORIDE 0.4 MG: 1 INJECTION, SOLUTION INTRAMUSCULAR; INTRAVENOUS; SUBCUTANEOUS at 14:28

## 2020-06-09 ENCOUNTER — OFFICE VISIT (OUTPATIENT)
Dept: SURGERY | Facility: CLINIC | Age: 50
End: 2020-06-09

## 2020-06-09 VITALS
WEIGHT: 162 LBS | HEIGHT: 64 IN | TEMPERATURE: 99.1 F | DIASTOLIC BLOOD PRESSURE: 68 MMHG | HEART RATE: 90 BPM | BODY MASS INDEX: 27.66 KG/M2 | SYSTOLIC BLOOD PRESSURE: 112 MMHG

## 2020-06-09 DIAGNOSIS — Z48.89 POSTOPERATIVE VISIT: Primary | ICD-10-CM

## 2020-06-09 PROCEDURE — 99024 POSTOP FOLLOW-UP VISIT: CPT | Performed by: SURGERY

## 2020-06-25 ENCOUNTER — OFFICE VISIT (OUTPATIENT)
Dept: SURGERY | Facility: CLINIC | Age: 50
End: 2020-06-25

## 2020-06-25 VITALS
WEIGHT: 160 LBS | SYSTOLIC BLOOD PRESSURE: 132 MMHG | BODY MASS INDEX: 27.31 KG/M2 | DIASTOLIC BLOOD PRESSURE: 70 MMHG | HEART RATE: 88 BPM | HEIGHT: 64 IN | TEMPERATURE: 98.4 F

## 2020-06-25 DIAGNOSIS — Z48.89 POSTOPERATIVE VISIT: Primary | ICD-10-CM

## 2020-06-25 PROCEDURE — 99024 POSTOP FOLLOW-UP VISIT: CPT | Performed by: SURGERY

## 2020-07-23 ENCOUNTER — OFFICE VISIT (OUTPATIENT)
Dept: SURGERY | Facility: CLINIC | Age: 50
End: 2020-07-23

## 2020-07-23 VITALS
BODY MASS INDEX: 27.86 KG/M2 | TEMPERATURE: 98.1 F | HEIGHT: 64 IN | HEART RATE: 84 BPM | DIASTOLIC BLOOD PRESSURE: 70 MMHG | SYSTOLIC BLOOD PRESSURE: 126 MMHG | WEIGHT: 163.2 LBS

## 2020-07-23 DIAGNOSIS — Z48.89 POSTOPERATIVE VISIT: Primary | ICD-10-CM

## 2020-07-23 PROCEDURE — 99024 POSTOP FOLLOW-UP VISIT: CPT | Performed by: SURGERY

## 2020-07-23 NOTE — PROGRESS NOTES
Assessment/Plan:    No problem-specific Assessment & Plan notes found for this encounter  Subjective: Pilonidal cyst     Patient ID: Juan Vazquez is a 47 y o  female          Objective:      LMP  (LMP Unknown)          Physical Exam

## 2020-07-23 NOTE — PROGRESS NOTES
Post-Op Follow Up- General Surgery   Sadie Bazzi 47 y o  female MRN: [id number]  Unit/Bed#:  Encounter: [id number]    Assessment/Plan     Assessment:  Status post excision of pilonidal cyst, improved  Plan:  Patient is discharged from my care and I will be glad to see her if any problem arises in the future, she is allowed to go back to work without restrictions on Monday  History of Present Illness     HPI:  Margaret Hernandez is a 47 y o  female who presents to my office for follow-up after excision of pilonidal cyst from May Jul 20, 2020  She offers no complaints at this time  Historical Information   History reviewed  No pertinent past medical history    Past Surgical History:   Procedure Laterality Date    HYSTERECTOMY      PILONIDAL CYST EXCISION N/A 5/27/2020    Procedure: EXCISION PILONIDAL CYST;  Surgeon: Jorden Ziegler MD;  Location: [facility] OR;  Service: General    TUBAL LIGATION      TUMOR REMOVAL      scalp      Social History   Social History     Substance and Sexual Activity   Alcohol Use Yes    Frequency: Monthly or less    Drinks per session: 1 or 2    Binge frequency: Never    Comment: occ     Social History     Substance and Sexual Activity   Drug Use No     Social History     Tobacco Use   Smoking Status Current Every Day Smoker    Packs/day: 0 20   Smokeless Tobacco Never Used     Family History: non-contributory    Meds/Allergies   all medications and allergies reviewed     Current Outpatient Medications:     albuterol (PROVENTIL HFA,VENTOLIN HFA) 90 mcg/act inhaler, Inhale 2 puffs every 4 (four) hours as needed for wheezing, Disp: 1 Inhaler, Rfl: 0    dicyclomine (BENTYL) 20 mg tablet, Take 1 tablet every 6 hours for abdominal cramping (Patient not taking: Reported on 5/17/2020), Disp: 12 tablet, Rfl: 0    ibuprofen (MOTRIN) 600 mg tablet, Take 1 tablet by mouth every 6 (six) hours as needed for mild pain (Patient not taking: Reported on 5/17/2020), Disp: 30 tablet, Rfl: 0   valACYclovir (VALTREX) 500 mg tablet, Take 1 tablet by mouth 2 (two) times a day for 3 days, Disp: 6 tablet, Rfl: 0  Allergies   Allergen Reactions    Other Hives     Apricots         Objective     Current Vitals:   Blood Pressure: 126/70 (07/23/20 1057)  Pulse: 84 (07/23/20 1057)  Temperature: 98 1 °F (36 7 °C) (07/23/20 1057)  Temp Source: Oral (07/23/20 1057)  Height: 5' 4" (162 6 cm) (07/23/20 1057)  Weight - Scale: 74 kg (163 lb 3 2 oz) (07/23/20 1057)      Physical Exam   Skin:   Incision from the pilonidal cyst excision is healed without evidence of infection  Nursing note and vitals reviewed

## 2020-11-23 ENCOUNTER — HOSPITAL ENCOUNTER (EMERGENCY)
Facility: HOSPITAL | Age: 50
Discharge: HOME | End: 2020-11-23
Attending: EMERGENCY MEDICINE
Payer: COMMERCIAL

## 2020-11-23 VITALS
OXYGEN SATURATION: 100 % | WEIGHT: 163.14 LBS | DIASTOLIC BLOOD PRESSURE: 78 MMHG | TEMPERATURE: 98 F | HEART RATE: 74 BPM | BODY MASS INDEX: 28 KG/M2 | SYSTOLIC BLOOD PRESSURE: 142 MMHG | RESPIRATION RATE: 18 BRPM

## 2020-11-23 DIAGNOSIS — M62.838 NECK MUSCLE SPASM: Primary | ICD-10-CM

## 2020-11-23 PROCEDURE — 99284 EMERGENCY DEPT VISIT MOD MDM: CPT

## 2020-11-23 PROCEDURE — 96372 THER/PROPH/DIAG INJ SC/IM: CPT

## 2020-11-23 PROCEDURE — 99284 EMERGENCY DEPT VISIT MOD MDM: CPT | Performed by: EMERGENCY MEDICINE

## 2020-11-23 RX ORDER — LIDOCAINE 50 MG/G
1 PATCH TOPICAL ONCE
Status: DISCONTINUED | OUTPATIENT
Start: 2020-11-23 | End: 2020-11-23 | Stop reason: HOSPADM

## 2020-11-23 RX ORDER — LIDOCAINE 50 MG/G
1 PATCH TOPICAL DAILY
Qty: 15 PATCH | Refills: 0 | Status: SHIPPED | OUTPATIENT
Start: 2020-11-23

## 2020-11-23 RX ORDER — METHOCARBAMOL 750 MG/1
750 TABLET, FILM COATED ORAL EVERY 6 HOURS PRN
Qty: 20 TABLET | Refills: 0 | Status: SHIPPED | OUTPATIENT
Start: 2020-11-23

## 2020-11-23 RX ORDER — KETOROLAC TROMETHAMINE 30 MG/ML
30 INJECTION, SOLUTION INTRAMUSCULAR; INTRAVENOUS ONCE
Status: COMPLETED | OUTPATIENT
Start: 2020-11-23 | End: 2020-11-23

## 2020-11-23 RX ORDER — NAPROXEN 500 MG/1
500 TABLET ORAL 2 TIMES DAILY WITH MEALS
Qty: 20 TABLET | Refills: 0 | Status: SHIPPED | OUTPATIENT
Start: 2020-11-23

## 2020-11-23 RX ORDER — METHOCARBAMOL 500 MG/1
1500 TABLET, FILM COATED ORAL ONCE
Status: COMPLETED | OUTPATIENT
Start: 2020-11-23 | End: 2020-11-23

## 2020-11-23 RX ORDER — OXYCODONE HYDROCHLORIDE AND ACETAMINOPHEN 5; 325 MG/1; MG/1
1 TABLET ORAL ONCE
Status: COMPLETED | OUTPATIENT
Start: 2020-11-23 | End: 2020-11-23

## 2020-11-23 RX ADMIN — LIDOCAINE 1 PATCH: 50 PATCH CUTANEOUS at 12:07

## 2020-11-23 RX ADMIN — OXYCODONE AND ACETAMINOPHEN 1 TABLET: 5; 325 TABLET ORAL at 12:08

## 2020-11-23 RX ADMIN — METHOCARBAMOL 1500 MG: 500 TABLET ORAL at 12:08

## 2020-11-23 RX ADMIN — KETOROLAC TROMETHAMINE 30 MG: 30 INJECTION, SOLUTION INTRAMUSCULAR at 12:08

## 2020-12-06 ENCOUNTER — HOSPITAL ENCOUNTER (EMERGENCY)
Facility: HOSPITAL | Age: 50
Discharge: HOME | End: 2020-12-06
Attending: EMERGENCY MEDICINE | Admitting: EMERGENCY MEDICINE
Payer: COMMERCIAL

## 2020-12-06 VITALS
TEMPERATURE: 97.6 F | RESPIRATION RATE: 18 BRPM | HEIGHT: 64 IN | HEART RATE: 95 BPM | SYSTOLIC BLOOD PRESSURE: 133 MMHG | DIASTOLIC BLOOD PRESSURE: 65 MMHG | BODY MASS INDEX: 27.74 KG/M2 | OXYGEN SATURATION: 98 % | WEIGHT: 162.48 LBS

## 2020-12-06 DIAGNOSIS — K08.89 PAIN, DENTAL: Primary | ICD-10-CM

## 2020-12-06 PROCEDURE — 99283 EMERGENCY DEPT VISIT LOW MDM: CPT

## 2020-12-06 PROCEDURE — 99284 EMERGENCY DEPT VISIT MOD MDM: CPT | Performed by: EMERGENCY MEDICINE

## 2020-12-06 RX ORDER — HYDROCODONE BITARTRATE AND ACETAMINOPHEN 5; 325 MG/1; MG/1
1 TABLET ORAL EVERY 6 HOURS PRN
Qty: 6 TABLET | Refills: 0 | Status: SHIPPED | OUTPATIENT
Start: 2020-12-06

## 2020-12-06 RX ORDER — LIDOCAINE HYDROCHLORIDE 20 MG/ML
15 SOLUTION OROPHARYNGEAL 4 TIMES DAILY PRN
Qty: 100 ML | Refills: 0 | Status: SHIPPED | OUTPATIENT
Start: 2020-12-06

## 2020-12-06 RX ORDER — DOXYCYCLINE 100 MG/1
100 CAPSULE ORAL 2 TIMES DAILY
Qty: 14 CAPSULE | Refills: 0 | Status: SHIPPED | OUTPATIENT
Start: 2020-12-06 | End: 2020-12-13

## 2021-04-21 ENCOUNTER — APPOINTMENT (EMERGENCY)
Dept: RADIOLOGY | Facility: HOSPITAL | Age: 51
End: 2021-04-21
Payer: COMMERCIAL

## 2021-04-21 ENCOUNTER — HOSPITAL ENCOUNTER (EMERGENCY)
Facility: HOSPITAL | Age: 51
Discharge: HOME | End: 2021-04-21
Attending: EMERGENCY MEDICINE
Payer: COMMERCIAL

## 2021-04-21 VITALS
RESPIRATION RATE: 18 BRPM | WEIGHT: 164.02 LBS | TEMPERATURE: 99.4 F | HEART RATE: 103 BPM | HEIGHT: 64 IN | OXYGEN SATURATION: 98 % | DIASTOLIC BLOOD PRESSURE: 72 MMHG | BODY MASS INDEX: 28 KG/M2 | SYSTOLIC BLOOD PRESSURE: 164 MMHG

## 2021-04-21 DIAGNOSIS — Z11.52 ENCOUNTER FOR SCREENING FOR COVID-19: ICD-10-CM

## 2021-04-21 DIAGNOSIS — R68.89 FLU-LIKE SYMPTOMS: Primary | ICD-10-CM

## 2021-04-21 LAB
ANION GAP SERPL CALCULATED.3IONS-SCNC: 11 MMOL/L (ref 4–13)
BASOPHILS # BLD AUTO: 0.03 THOUSANDS/ΜL (ref 0–0.1)
BASOPHILS NFR BLD AUTO: 1 % (ref 0–1)
BUN SERPL-MCNC: 12 MG/DL (ref 5–25)
CALCIUM SERPL-MCNC: 8.1 MG/DL (ref 8.3–10.1)
CHLORIDE SERPL-SCNC: 102 MMOL/L (ref 100–108)
CO2 SERPL-SCNC: 25 MMOL/L (ref 21–32)
CREAT SERPL-MCNC: 0.65 MG/DL (ref 0.6–1.3)
EOSINOPHIL # BLD AUTO: 0.08 THOUSAND/ΜL (ref 0–0.61)
EOSINOPHIL NFR BLD AUTO: 2 % (ref 0–6)
ERYTHROCYTE [DISTWIDTH] IN BLOOD BY AUTOMATED COUNT: 14.5 % (ref 11.6–15.1)
GFR SERPL CREATININE-BSD FRML MDRD: 119 ML/MIN/1.73SQ M
GLUCOSE SERPL-MCNC: 102 MG/DL (ref 65–140)
HCT VFR BLD AUTO: 35.9 % (ref 34.8–46.1)
HGB BLD-MCNC: 11.8 G/DL (ref 11.5–15.4)
IMM GRANULOCYTES # BLD AUTO: 0.02 THOUSAND/UL (ref 0–0.2)
IMM GRANULOCYTES NFR BLD AUTO: 0 % (ref 0–2)
LYMPHOCYTES # BLD AUTO: 0.99 THOUSANDS/ΜL (ref 0.6–4.47)
LYMPHOCYTES NFR BLD AUTO: 22 % (ref 14–44)
MCH RBC QN AUTO: 27.9 PG (ref 26.8–34.3)
MCHC RBC AUTO-ENTMCNC: 32.9 G/DL (ref 31.4–37.4)
MCV RBC AUTO: 85 FL (ref 82–98)
MONOCYTES # BLD AUTO: 0.92 THOUSAND/ΜL (ref 0.17–1.22)
MONOCYTES NFR BLD AUTO: 20 % (ref 4–12)
NEUTROPHILS # BLD AUTO: 2.56 THOUSANDS/ΜL (ref 1.85–7.62)
NEUTS SEG NFR BLD AUTO: 55 % (ref 43–75)
NRBC BLD AUTO-RTO: 0 /100 WBCS
PLATELET # BLD AUTO: 169 THOUSANDS/UL (ref 149–390)
PMV BLD AUTO: 10.7 FL (ref 8.9–12.7)
POTASSIUM SERPL-SCNC: 3.3 MMOL/L (ref 3.5–5.3)
RBC # BLD AUTO: 4.23 MILLION/UL (ref 3.81–5.12)
SODIUM SERPL-SCNC: 138 MMOL/L (ref 136–145)
WBC # BLD AUTO: 4.6 THOUSAND/UL (ref 4.31–10.16)

## 2021-04-21 PROCEDURE — 80048 BASIC METABOLIC PNL TOTAL CA: CPT | Performed by: EMERGENCY MEDICINE

## 2021-04-21 PROCEDURE — 71045 X-RAY EXAM CHEST 1 VIEW: CPT

## 2021-04-21 PROCEDURE — 36415 COLL VENOUS BLD VENIPUNCTURE: CPT | Performed by: EMERGENCY MEDICINE

## 2021-04-21 PROCEDURE — 99283 EMERGENCY DEPT VISIT LOW MDM: CPT

## 2021-04-21 PROCEDURE — 85025 COMPLETE CBC W/AUTO DIFF WBC: CPT | Performed by: EMERGENCY MEDICINE

## 2021-04-21 PROCEDURE — 87635 SARS-COV-2 COVID-19 AMP PRB: CPT | Performed by: EMERGENCY MEDICINE

## 2021-04-21 PROCEDURE — 99284 EMERGENCY DEPT VISIT MOD MDM: CPT | Performed by: EMERGENCY MEDICINE

## 2021-04-21 RX ORDER — ACETAMINOPHEN 325 MG/1
975 TABLET ORAL ONCE
Status: COMPLETED | OUTPATIENT
Start: 2021-04-21 | End: 2021-04-21

## 2021-04-21 RX ORDER — POTASSIUM CHLORIDE 20 MEQ/1
20 TABLET, EXTENDED RELEASE ORAL ONCE
Status: COMPLETED | OUTPATIENT
Start: 2021-04-21 | End: 2021-04-21

## 2021-04-21 RX ADMIN — POTASSIUM CHLORIDE 20 MEQ: 1500 TABLET, EXTENDED RELEASE ORAL at 21:55

## 2021-04-21 RX ADMIN — ACETAMINOPHEN 975 MG: 325 TABLET, FILM COATED ORAL at 21:55

## 2021-04-21 NOTE — Clinical Note
Nathan Lewis was seen and treated in our emergency department on 4/21/2021  Diagnosis:     Sadie  is off the rest of the shift today  She may return on this date: If you have any questions or concerns, please don't hesitate to call        Mukul Newton RN    ______________________________           _______________          _______________  Hospital Representative                              Date                                Time

## 2021-04-21 NOTE — Clinical Note
Shireen Pressley was seen and treated in our emergency department on 4/21/2021  Diagnosis:     Sadie  is off the rest of the shift today  She may return on this date: If you have any questions or concerns, please don't hesitate to call        Germaine Hawthorne RN    ______________________________           _______________          _______________  Hospital Representative                              Date                                Time

## 2021-04-21 NOTE — Clinical Note
Anton Stinson was seen and treated in our emergency department on 4/21/2021  Diagnosis:     Joaquin Gottron  may return to work on return date  She may return on this date: 05/04/2021         If you have any questions or concerns, please don't hesitate to call        Chance Ferrell RN    ______________________________           _______________          _______________  Hospital Representative                              Date                                Time

## 2021-04-22 LAB — SARS-COV-2 RNA RESP QL NAA+PROBE: POSITIVE

## 2021-04-22 NOTE — DISCHARGE INSTRUCTIONS

## 2021-04-22 NOTE — ED PROVIDER NOTES
Pt Name: Farhana Hernandez  MRN: [id number]  [place] 1970  Age/Sex: 45 y o  female  Date of evaluation: 4/21/2021  PCP: Osei Royal MD    CHIEF COMPLAINT    Chief Complaint   Patient presents with    Flu Symptoms     Pt states she checked her temp at home and it was 102, she states she also has a cough and general body aches and weakness         HPI    45 y o  female presenting with flu like symptoms  Patient states she woke up this morning with fever, chills, muscle aches  Mentions T-max of 102° F  States she was exposed to a sick contact at work  Mentions lightheadedness and fatigue  Denies sore throat, runny nose congestion  Does mention cough  States her  had pneumonia 1 week ago  She does smoke tobacco   Denies shortness of breath and chest pain  No urinary symptoms  Past Medical and Surgical History    History reviewed  No pertinent past medical history  Past Surgical History:   Procedure Laterality Date    HYSTERECTOMY      PILONIDAL CYST EXCISION N/A 5/27/2020    Procedure: EXCISION PILONIDAL CYST;  Surgeon: Yeni Peres MD;  Location: [facility];  Service: General    TUBAL LIGATION      TUMOR REMOVAL      scalp        History reviewed  No pertinent family history  Social History     Tobacco Use    Smoking status: Current Every Day Smoker     Packs/day: 0 20    Smokeless tobacco: Never Used   Substance Use Topics    Alcohol use: Yes     Frequency: Monthly or less     Drinks per session: 1 or 2     Binge frequency: Never     Comment: occ    Drug use: No           Allergies    Allergies   Allergen Reactions    Other Hives     Apricots         Home Medications    Prior to Admission medications    Medication Sig Start Date End Date Taking?  Authorizing Provider   albuterol (PROVENTIL HFA,VENTOLIN HFA) 90 mcg/act inhaler Inhale 2 puffs every 4 (four) hours as needed for wheezing 3/25/19   Deni Aquino,    dicyclomine (BENTYL) 20 mg tablet Take 1 tablet every 6 hours for abdominal cramping  Patient not taking: Reported on 5/17/2020 1/6/18   Mitch Murdock DO   HYDROcodone-acetaminophen [facility]) 5-325 mg per tablet Take 1 tablet by mouth every 6 (six) hours as needed for pain for up to 6 dosesMax Daily Amount: 4 tablets 12/6/20   Asa MD Mike   ibuprofen (MOTRIN) 600 mg tablet Take 1 tablet by mouth every 6 (six) hours as needed for mild pain  Patient not taking: Reported on 5/17/2020 11/11/17   López Molina DO   lidocaine (LIDODERM) 5 % Apply 1 patch topically daily Remove & Discard patch within 12 hours or as directed by MD 11/23/20   Aminah Poole MD   Lidocaine Viscous HCl (XYLOCAINE) 2 % mucosal solution Swish and spit 15 mL 4 (four) times a day as needed for mouth pain or discomfort 12/6/20   Asa MD Mike   Lidocaine Viscous HCl (XYLOCAINE) 2 % mucosal solution Swish and spit 15 mL 4 (four) times a day as needed for mouth pain or discomfort 12/6/20   Asa MD Mike   Lidocaine Viscous HCl (XYLOCAINE) 2 % mucosal solution Swish and spit 15 mL 4 (four) times a day as needed for mouth pain or discomfort 12/6/20   Joey Mckeon MD   methocarbamol (ROBAXIN) 750 mg tablet Take 1 tablet (750 mg total) by mouth every 6 (six) hours as needed for muscle spasms 11/23/20   Aminah Poole MD   naproxen (NAPROSYN) 500 mg tablet Take 1 tablet (500 mg total) by mouth 2 (two) times a day with meals 11/23/20   Aminah Poole MD   valACYclovir (VALTREX) 500 mg tablet Take 1 tablet by mouth 2 (two) times a day for 3 days 1/6/18 5/27/20  Mitch Murdock DO           Review of Systems    Review of Systems   Constitutional: Positive for chills and fever  HENT: Negative for rhinorrhea and sore throat  Eyes: Negative for pain and visual disturbance  Respiratory: Positive for cough  Negative for shortness of breath  Cardiovascular: Negative for chest pain and leg swelling  Gastrointestinal: Negative for abdominal pain, nausea and vomiting     Genitourinary: Negative for dysuria and hematuria  Musculoskeletal: Positive for myalgias  Negative for joint swelling  Skin: Negative for rash and wound  Neurological: Positive for light-headedness  Negative for syncope and headaches  Physical Exam      ED Triage Vitals   Temperature Pulse Respirations Blood Pressure SpO2   04/21/21 2043 04/21/21 2043 04/21/21 2043 04/21/21 2043 04/21/21 2043   99 4 °F (37 4 °C) 103 18 164/72 98 %      Temp Source Heart Rate Source Patient Position - Orthostatic VS BP Location FiO2 (%)   04/21/21 2043 04/21/21 2043 04/21/21 2043 04/21/21 2043 --   Oral Monitor Sitting Left arm       Pain Score       04/21/21 2155       Worst Possible Pain               Physical Exam  Constitutional:       General: She is not in acute distress  Appearance: She is not toxic-appearing  HENT:      Head: Normocephalic and atraumatic  Nose: Nose normal    Eyes:      Extraocular Movements: Extraocular movements intact  Pupils: Pupils are equal, round, and reactive to light  Neck:      Musculoskeletal: Normal range of motion and neck supple  Cardiovascular:      Rate and Rhythm: Normal rate and regular rhythm  Pulmonary:      Effort: Pulmonary effort is normal  No respiratory distress  Abdominal:      General: There is no distension  Tenderness: There is no abdominal tenderness  Musculoskeletal: Normal range of motion  General: No swelling or deformity  Skin:     General: Skin is warm  Findings: No erythema  Neurological:      Mental Status: She is alert and oriented to person, place, and time  Mental status is at baseline                Diagnostic Results      Labs:    Results Reviewed     Procedure Component Value Units Date/Time    Basic metabolic panel [[id number]]  (Abnormal) Collected: 04/21/21 2118    Lab Status: Final result Specimen: Blood from Arm, Right Updated: 04/21/21 2134     Sodium 138 mmol/L      Potassium 3 3 mmol/L      Chloride 102 mmol/L CO2 25 mmol/L      ANION GAP 11 mmol/L      BUN 12 mg/dL      Creatinine 0 65 mg/dL      Glucose 102 mg/dL      Calcium 8 1 mg/dL      eGFR 119 ml/min/1 73sq m     Narrative:      [place] guidelines for Chronic Kidney Disease (CKD):     Stage 1 with normal or high GFR (GFR > 90 mL/min/1 73 square meters)    Stage 2 Mild CKD (GFR = 60-89 mL/min/1 73 square meters)    Stage 3A Moderate CKD (GFR = 45-59 mL/min/1 73 square meters)    Stage 3B Moderate CKD (GFR = 30-44 mL/min/1 73 square meters)    Stage 4 Severe CKD (GFR = 15-29 mL/min/1 73 square meters)    Stage 5 End Stage CKD (GFR <15 mL/min/1 73 square meters)  Note: GFR calculation is accurate only with a steady state creatinine    CBC and differential [[id number]]  (Abnormal) Collected: 04/21/21 2118    Lab Status: Final result Specimen: Blood from Arm, Right Updated: 04/21/21 2124     WBC 4 60 Thousand/uL      RBC 4 23 Million/uL      Hemoglobin 11 8 g/dL      Hematocrit 35 9 %      MCV 85 fL      MCH 27 9 pg      MCHC 32 9 g/dL      RDW 14 5 %      MPV 10 7 fL      Platelets 005 Thousands/uL      nRBC 0 /100 WBCs      Neutrophils Relative 55 %      Immat GRANS % 0 %      Lymphocytes Relative 22 %      Monocytes Relative 20 %      Eosinophils Relative 2 %      Basophils Relative 1 %      Neutrophils Absolute 2 56 Thousands/µL      Immature Grans Absolute 0 02 Thousand/uL      Lymphocytes Absolute 0 99 Thousands/µL      Monocytes Absolute 0 92 Thousand/µL      Eosinophils Absolute 0 08 Thousand/µL      Basophils Absolute 0 03 Thousands/µL     Novel Coronavirus Alberta Berry [[id number]] Collected: 04/21/21 2118    Lab Status:  In process Specimen: Nares from Nose Updated: 04/21/21 2121          All labs reviewed and utilized in the medical decision making process    Radiology:    XR chest 1 view portable    (Results Pending)       All radiology studies independently viewed by me and interpreted by the radiologist     Procedure    Procedures        MDM    Patient's symptoms primarily concerning for COVID-19 infection, could be other viral syndrome as well  Less likely bacterial pneumonia  Blood work overall reassuring, mild hypokalemia, was given potassium repletion  Given Tylenol for muscle aches  COVID swab  No acute cardiopulmonary process on chest x-ray  Not requiring any supplemental oxygen  Advise quarantine at home  Tylenol as needed for pain  Multivitamins  Virtual follow-up with PCP  ED return precautions discussed, patient verbalized understanding and is in agreement with this plan  Medications   acetaminophen (TYLENOL) tablet 975 mg (975 mg Oral Given 4/21/21 2155)   potassium chloride (K-DUR,KLOR-CON) CR tablet 20 mEq (20 mEq Oral Given 4/21/21 2155)           FINAL IMPRESSION    Final diagnoses:   Flu-like symptoms   Encounter for screening for COVID-19         DISPOSITION    Time reflects when diagnosis was documented in both MDM as applicable and the Disposition within this note     Time User Action Codes Description Comment    4/21/2021  9:44 PM Arelis Geiger [R68 89] Flu-like symptoms     4/21/2021  9:44 PM Deloris Millan Add [Z11 52] Encounter for screening for COVID-19       ED Disposition     ED Disposition Condition Date/Time Comment    Discharge Stable Wed Apr 21, 2021  9:44 PM [address] [place] [address] discharge to home/self care  Follow-up Information     Follow up With Specialties Details Why Contact Info    Lane Lozano MD Family Medicine Call in 2 days  [address] [place] [address]  [phone]              PATIENT REFERRED TO:    Lane Lozano MD  [facility] [address] [place] [address] [address]  [phone]    Call in 2 days        DISCHARGE MEDICATIONS:    Patient's Medications   Discharge Prescriptions    No medications on file       No discharge procedures on file           Vonnie Apley, DO        This note was partially completed using voice recognition technology, and was scanned for gross errors; however some errors may still exist  Please contact the author with any questions or requests for clarification        Christiano Porter DO  04/21/21 [id number]

## 2021-04-22 NOTE — RESULT ENCOUNTER NOTE
Called patient to inform of COVID test results  Discussed with patient that Patients with COVID should remain on self-quarantine until 7 days after the time of initial symptom onset OR 24 hours after resolution of fever (without antipyretics) and symptoms, whichever is longer

## 2021-07-05 ENCOUNTER — HOSPITAL ENCOUNTER (EMERGENCY)
Facility: HOSPITAL | Age: 51
Discharge: HOME | End: 2021-07-05
Attending: EMERGENCY MEDICINE
Payer: COMMERCIAL

## 2021-07-05 VITALS
RESPIRATION RATE: 17 BRPM | BODY MASS INDEX: 28.15 KG/M2 | OXYGEN SATURATION: 97 % | HEART RATE: 87 BPM | TEMPERATURE: 98.6 F | SYSTOLIC BLOOD PRESSURE: 158 MMHG | WEIGHT: 164 LBS | DIASTOLIC BLOOD PRESSURE: 89 MMHG

## 2021-07-05 DIAGNOSIS — J18.9 CAP (COMMUNITY ACQUIRED PNEUMONIA): Primary | ICD-10-CM

## 2021-07-05 PROCEDURE — 99283 EMERGENCY DEPT VISIT LOW MDM: CPT

## 2021-07-05 PROCEDURE — 99284 EMERGENCY DEPT VISIT MOD MDM: CPT | Performed by: PHYSICIAN ASSISTANT

## 2021-07-05 RX ORDER — AZITHROMYCIN 250 MG/1
TABLET, FILM COATED ORAL
Qty: 6 TABLET | Refills: 0 | Status: SHIPPED | OUTPATIENT
Start: 2021-07-05 | End: 2021-07-09

## 2021-07-05 RX ORDER — GUAIFENESIN AND CODEINE PHOSPHATE 100; 10 MG/5ML; MG/5ML
5 SOLUTION ORAL 3 TIMES DAILY PRN
Qty: 90 ML | Refills: 0 | Status: SHIPPED | OUTPATIENT
Start: 2021-07-05 | End: 2021-07-12

## 2021-07-05 RX ORDER — ALBUTEROL SULFATE 90 UG/1
2 AEROSOL, METERED RESPIRATORY (INHALATION) ONCE
Status: COMPLETED | OUTPATIENT
Start: 2021-07-05 | End: 2021-07-05

## 2021-07-05 RX ORDER — PREDNISONE 1 MG/1
TABLET ORAL
Qty: 21 TABLET | Refills: 0 | Status: SHIPPED | OUTPATIENT
Start: 2021-07-05

## 2021-07-05 RX ADMIN — ALBUTEROL SULFATE 2 PUFF: 90 AEROSOL, METERED RESPIRATORY (INHALATION) at 13:50

## 2021-07-05 NOTE — ED PROVIDER NOTES
History  Chief Complaint   Patient presents with    Flu Symptoms     pt presents with cough, headache, congestion  since friday  pt states to have tried OTC medication without relief      45 y o  female with no significant past medical history presents to ED with chief complaint of cough  Onset of symptoms reported as 3 days ago  Location of symptoms reported as the chest  Quality is reported as frequent cough  Severity is reported as moderate  Associated symptoms: denies fever,  Positive for cough, positive for congestion, denies lower extremity swelling, denies rash  Denies lip/tongue/facial swelling  Modifying factors: tried OTC cough/cold medication without relief  Context: denies recent sick contacts, patient is a smoker  Developed cough/congestion symptoms 3 days ago which feel similar to prior episode of pneumonia  Denies recent travel outside the country  Immunizations reportedly up to date  Reviewed past medical history and visits via EPIC: patient last seen in ed on 4/21/21 for evaluation of flu like symptoms  History provided by:  Patient   used: No    Flu Symptoms  Presenting symptoms: cough, headache and rhinorrhea    Presenting symptoms: no diarrhea, no fatigue, no fever, no myalgias, no nausea, no shortness of breath, no sore throat and no vomiting    Associated symptoms: nasal congestion    Associated symptoms: no chills, no ear pain and no neck stiffness        Prior to Admission Medications   Prescriptions Last Dose Informant Patient Reported? Taking?    HYDROcodone-acetaminophen (NORCO) 5-325 mg per tablet   No No   Sig: Take 1 tablet by mouth every 6 (six) hours as needed for pain for up to 6 dosesMax Daily Amount: 4 tablets   Lidocaine Viscous HCl (XYLOCAINE) 2 % mucosal solution   No No   Sig: Swish and spit 15 mL 4 (four) times a day as needed for mouth pain or discomfort   Lidocaine Viscous HCl (XYLOCAINE) 2 % mucosal solution   No No   Sig: Swish and spit 15 mL 4 (four) times a day as needed for mouth pain or discomfort   Lidocaine Viscous HCl (XYLOCAINE) 2 % mucosal solution   No No   Sig: Swish and spit 15 mL 4 (four) times a day as needed for mouth pain or discomfort   albuterol (PROVENTIL HFA,VENTOLIN HFA) 90 mcg/act inhaler   No No   Sig: Inhale 2 puffs every 4 (four) hours as needed for wheezing   dicyclomine (BENTYL) 20 mg tablet   No No   Sig: Take 1 tablet every 6 hours for abdominal cramping   Patient not taking: Reported on 5/17/2020   ibuprofen (MOTRIN) 600 mg tablet   No No   Sig: Take 1 tablet by mouth every 6 (six) hours as needed for mild pain   Patient not taking: Reported on 5/17/2020   lidocaine (LIDODERM) 5 %   No No   Sig: Apply 1 patch topically daily Remove & Discard patch within 12 hours or as directed by MD   methocarbamol (ROBAXIN) 750 mg tablet   No No   Sig: Take 1 tablet (750 mg total) by mouth every 6 (six) hours as needed for muscle spasms   naproxen (NAPROSYN) 500 mg tablet   No No   Sig: Take 1 tablet (500 mg total) by mouth 2 (two) times a day with meals   valACYclovir (VALTREX) 500 mg tablet   No No   Sig: Take 1 tablet by mouth 2 (two) times a day for 3 days      Facility-Administered Medications: None       History reviewed  No pertinent past medical history  Past Surgical History:   Procedure Laterality Date    HYSTERECTOMY      PILONIDAL CYST EXCISION N/A 5/27/2020    Procedure: EXCISION PILONIDAL CYST;  Surgeon: Anthony Juares MD;  Location: [facility];  Service: General    TUBAL LIGATION      TUMOR REMOVAL      scalp        History reviewed  No pertinent family history  I have reviewed and agree with the history as documented      E-Cigarette/Vaping    E-Cigarette Use Never User      E-Cigarette/Vaping Substances    Nicotine No     THC No     CBD No     Flavoring No     Other No     Unknown No      Social History     Tobacco Use    Smoking status: Current Every Day Smoker     Packs/day: 0 20    Smokeless tobacco: Never Used   Vaping Use    Vaping Use: Never used   Substance Use Topics    Alcohol use: Yes     Comment: occ    Drug use: No       Review of Systems   Constitutional: Negative for activity change, appetite change, chills, diaphoresis, fatigue, fever and unexpected weight change  HENT: Positive for congestion and rhinorrhea  Negative for dental problem, drooling, ear discharge, ear pain, facial swelling, hearing loss, mouth sores, nosebleeds, postnasal drip, sinus pressure, sinus pain, sneezing, sore throat, tinnitus, trouble swallowing and voice change  Eyes: Negative for photophobia, pain, discharge, redness, itching and visual disturbance  Respiratory: Positive for cough and wheezing  Negative for apnea, choking, chest tightness, shortness of breath and stridor  Cardiovascular: Negative for chest pain, palpitations and leg swelling  Gastrointestinal: Negative for abdominal distention, abdominal pain, anal bleeding, blood in stool, constipation, diarrhea, nausea, rectal pain and vomiting  Endocrine: Negative for cold intolerance, heat intolerance, polydipsia, polyphagia and polyuria  Genitourinary: Negative for decreased urine volume, difficulty urinating, dysuria, flank pain, hematuria and urgency  Musculoskeletal: Negative for arthralgias, back pain, gait problem, joint swelling, myalgias, neck pain and neck stiffness  Skin: Negative for color change, pallor, rash and wound  Allergic/Immunologic: Negative for environmental allergies, food allergies and immunocompromised state  Neurological: Positive for headaches  Negative for dizziness, tremors, seizures, syncope, facial asymmetry, speech difficulty, weakness, light-headedness and numbness  Hematological: Negative for adenopathy  Does not bruise/bleed easily  Psychiatric/Behavioral: Negative for agitation, behavioral problems and confusion  The patient is not nervous/anxious      All other systems reviewed and are negative  Physical Exam  Physical Exam  Vitals and nursing note reviewed  Constitutional:       General: She is not in acute distress  Appearance: Normal appearance  She is well-developed  She is not diaphoretic  Comments: /89 (BP Location: Left arm)   Pulse 87   Temp 98 6 °F (37 °C) (Oral)   Resp 17   Wt 74 4 kg (164 lb)   LMP  (LMP Unknown)   SpO2 97%   BMI 28 15 kg/m²      HENT:      Head: Normocephalic and atraumatic  Right Ear: External ear normal       Left Ear: External ear normal       Mouth/Throat:      Pharynx: No oropharyngeal exudate or posterior oropharyngeal erythema  Eyes:      General: No scleral icterus  Right eye: No discharge  Left eye: No discharge  Conjunctiva/sclera: Conjunctivae normal       Pupils: Pupils are equal, round, and reactive to light  Neck:      Vascular: No JVD  Trachea: No tracheal deviation  Cardiovascular:      Rate and Rhythm: Normal rate and regular rhythm  Heart sounds: S1 normal and S2 normal    Pulmonary:      Effort: Pulmonary effort is normal  No respiratory distress  Breath sounds: No stridor  Wheezing and rhonchi present  No rales  Comments: Crackles at right lung base  Scattered wheezing on auscultation  Chest:      Chest wall: No tenderness  Musculoskeletal:         General: No tenderness, deformity or signs of injury  Normal range of motion  Cervical back: Normal range of motion and neck supple  No rigidity  Skin:     General: Skin is warm and dry  Coloration: Skin is not jaundiced or pale  Findings: No erythema or rash  Neurological:      General: No focal deficit present  Mental Status: She is alert and oriented to person, place, and time  Mental status is at baseline  Cranial Nerves: No cranial nerve deficit  Sensory: No sensory deficit  Motor: No abnormal muscle tone        Coordination: Coordination normal       Deep Tendon Reflexes: Reflexes are normal and symmetric  Reflexes normal    Psychiatric:         Mood and Affect: Mood normal          Behavior: Behavior normal          Thought Content: Thought content normal          Judgment: Judgment normal          Vital Signs  ED Triage Vitals [07/05/21 1331]   Temperature Pulse Respirations Blood Pressure SpO2   98 6 °F (37 °C) 87 17 158/89 97 %      Temp Source Heart Rate Source Patient Position - Orthostatic VS BP Location FiO2 (%)   Oral Monitor Sitting Left arm --      Pain Score       --           Vitals:    07/05/21 1331   BP: 158/89   Pulse: 87   Patient Position - Orthostatic VS: Sitting         Visual Acuity      ED Medications  Medications   albuterol (PROVENTIL HFA,VENTOLIN HFA) inhaler 2 puff (2 puffs Inhalation Given 7/5/21 1350)       Diagnostic Studies  Results Reviewed     None                 No orders to display              Procedures  Procedures         ED Course                             SBIRT 22yo+      Most Recent Value   SBIRT (24 yo +)   In order to provide better care to our patients, we are screening all of our patients for alcohol and drug use  Would it be okay to ask you these screening questions? Yes Filed at: 07/05/2021 1344   Initial Alcohol Screen: US AUDIT-C    1  How often do you have a drink containing alcohol?  0 Filed at: 07/05/2021 1344   2  How many drinks containing alcohol do you have on a typical day you are drinking? 0 Filed at: 07/05/2021 1344   3a  Male UNDER 65: How often do you have five or more drinks on one occasion? 0 Filed at: 07/05/2021 1344   3b  FEMALE Any Age, or MALE 65+: How often do you have 4 or more drinks on one occassion? 0 Filed at: 07/05/2021 1344   Audit-C Score  0 Filed at: 07/05/2021 1344   LAUREN: How many times in the past year have you    Used an illegal drug or used a prescription medication for non-medical reasons?   Never Filed at: 07/05/2021 1344                    MDM  Number of Diagnoses or Management Options  CAP (community acquired pneumonia): new and does not require workup  Diagnosis management comments: MDM:  ddx includes but is not limited to:  URI, flu, allergies, asthma, environmental exposures, foreign body, GERD, bronchitis, pneumonia, consider but doubt chf, pe, interstitial lung disease, pertussis  Discussed with patient symptoms clinically appear consistent with community acquired pneumonia  Discussed treatment plan including albuteral inhaler, add guifenasin with codeine for cough  Standard narcotic precautions given  Treatment with zpack  Prednisone taper  Discussed smoking cessation  Follow up with pcp and pulmonology for recheck in 2-3 days for recheck  Reviewed reasons to return to ed  Patient verbalized understanding of diagnosis and agreement with discharge plan of care as well as understanding of reasons to return to ed  I have reasonably determine that electronically prescribing a controlled substance would be impractical for the patient to obtain the controlled substance prescribed by electronic prescription or would cause an untimely delay resulting in an adverse impact on the patient's medical condition        Patient was seen during the outbreak of the corona virus epidemic   Resources are limited due to the severity of patient illnesses associated with virus   Testing is also limited at this time   Discussed with patient at the time of this evaluation   Due to the fact that limited resources are available -treatment options are limited             Amount and/or Complexity of Data Reviewed  Review and summarize past medical records: yes    Patient Progress  Patient progress: stable      Disposition  Final diagnoses:   CAP (community acquired pneumonia)     Time reflects when diagnosis was documented in both MDM as applicable and the Disposition within this note     Time User Action Codes Description Comment    7/5/2021  1:44 PM Roberto Keller Add [J18 9] CAP (community acquired pneumonia) ED Disposition     ED Disposition Condition Date/Time Comment    Discharge Stable Mon Jul 5, 2021  1:44 PM Sadie Bazzi discharge to home/self care  Follow-up Information     Follow up With Specialties Details Why Contact Info Additional Information    Marybeth Casillas MD Family Medicine Call in 2 days for further evaluation of symptoms [address] [facility]  [address] [facility] [id number] [address] [facility]  Emergency Department Emergency Medicine Go to  If symptoms worsen [address] [facility] [address] [facility] Emergency Department, [address] [place], [place], [address]    Daniel Reyes MD Pulmonology, Neurology, Pulmonary Disease, Sleep Medicine Call in 2 days for further evaluation of symptoms [address] [address]  [phone]             Discharge Medication List as of 7/5/2021  1:47 PM      START taking these medications    Details   azithromycin (ZITHROMAX) 250 mg tablet 2 tablets PO on day 1, then 1 tablet PO daily x 4 days  , Normal      guaifenesin-codeine (GUAIFENESIN AC) 100-10 MG/5ML liquid Take 5 mL by mouth 3 (three) times a day as needed for cough for up to 7 days, Starting Mon 7/5/2021, Until Mon 7/12/2021 at 2359, Print      predniSONE 5 mg tablet 40 mg PO day 1, then 30 mg PO day 2, 20 mg PO day 3, 10 mg PO day 4, 5 mg PO day 5 then stop, Print         CONTINUE these medications which have NOT CHANGED    Details   albuterol (PROVENTIL HFA,VENTOLIN HFA) 90 mcg/act inhaler Inhale 2 puffs every 4 (four) hours as needed for wheezing, Starting Mon 3/25/2019, Print      dicyclomine (BENTYL) 20 mg tablet Take 1 tablet every 6 hours for abdominal cramping, Print      HYDROcodone-acetaminophen (NORCO) 5-325 mg per tablet Take 1 tablet by mouth every 6 (six) hours as needed for pain for up to 6 dosesMax Daily Amount: 4 tablets, Starting Sun 12/6/2020, Print ibuprofen (MOTRIN) 600 mg tablet Take 1 tablet by mouth every 6 (six) hours as needed for mild pain, Starting Sat 11/11/2017, Print      lidocaine (LIDODERM) 5 % Apply 1 patch topically daily Remove & Discard patch within 12 hours or as directed by MD, Starting Mon 11/23/2020, Print      !! Lidocaine Viscous HCl (XYLOCAINE) 2 % mucosal solution Swish and spit 15 mL 4 (four) times a day as needed for mouth pain or discomfort, Starting Sun 12/6/2020, Print      !! Lidocaine Viscous HCl (XYLOCAINE) 2 % mucosal solution Swish and spit 15 mL 4 (four) times a day as needed for mouth pain or discomfort, Starting Sun 12/6/2020, Print      !! Lidocaine Viscous HCl (XYLOCAINE) 2 % mucosal solution Swish and spit 15 mL 4 (four) times a day as needed for mouth pain or discomfort, Starting Sun 12/6/2020, Print      methocarbamol (ROBAXIN) 750 mg tablet Take 1 tablet (750 mg total) by mouth every 6 (six) hours as needed for muscle spasms, Starting Mon 11/23/2020, Print      naproxen (NAPROSYN) 500 mg tablet Take 1 tablet (500 mg total) by mouth 2 (two) times a day with meals, Starting Mon 11/23/2020, Print      valACYclovir (VALTREX) 500 mg tablet Take 1 tablet by mouth 2 (two) times a day for 3 days, Starting Sat 1/6/2018, Until Wed 5/27/2020, Print       !! - Potential duplicate medications found  Please discuss with provider  No discharge procedures on file      PDMP Review     None          ED Provider  Electronically Signed by           Ming Butt PA-C  07/05/21 1400

## 2021-08-08 ENCOUNTER — HOSPITAL ENCOUNTER (EMERGENCY)
Facility: HOSPITAL | Age: 51
Discharge: HOME | End: 2021-08-08
Attending: EMERGENCY MEDICINE
Payer: COMMERCIAL

## 2021-08-08 VITALS
HEART RATE: 91 BPM | TEMPERATURE: 97.9 F | OXYGEN SATURATION: 98 % | DIASTOLIC BLOOD PRESSURE: 90 MMHG | WEIGHT: 171.08 LBS | RESPIRATION RATE: 16 BRPM | BODY MASS INDEX: 29.37 KG/M2 | SYSTOLIC BLOOD PRESSURE: 188 MMHG

## 2021-08-08 DIAGNOSIS — K08.89 PAIN, DENTAL: Primary | ICD-10-CM

## 2021-08-08 PROCEDURE — 99284 EMERGENCY DEPT VISIT MOD MDM: CPT | Performed by: PHYSICIAN ASSISTANT

## 2021-08-08 PROCEDURE — 99282 EMERGENCY DEPT VISIT SF MDM: CPT

## 2021-08-08 RX ORDER — PENICILLIN V POTASSIUM 500 MG/1
500 TABLET ORAL 3 TIMES DAILY
Qty: 30 TABLET | Refills: 0 | Status: SHIPPED | OUTPATIENT
Start: 2021-08-08 | End: 2021-08-18

## 2021-08-08 RX ORDER — HYDROCODONE BITARTRATE AND ACETAMINOPHEN 5; 325 MG/1; MG/1
1 TABLET ORAL EVERY 6 HOURS PRN
Qty: 8 TABLET | Refills: 0 | Status: SHIPPED | OUTPATIENT
Start: 2021-08-08

## 2021-08-08 RX ORDER — IBUPROFEN 400 MG/1
400 TABLET ORAL ONCE
Status: COMPLETED | OUTPATIENT
Start: 2021-08-08 | End: 2021-08-08

## 2021-08-08 RX ADMIN — IBUPROFEN 400 MG: 400 TABLET ORAL at 07:49

## 2021-08-08 NOTE — ED PROVIDER NOTES
History  Chief Complaint   Patient presents with    Dental Pain     pt states to have started with left sided dental pain on upper and lower as of yesterday  45 y o  female with no significant past medical history presents to ED with chief complaint of dental pain  Onset reported as 1 day ago  Location of symptoms is reported as left upper tooth  Quality is reported as throbbing pain  Severity is reported as severe  Associated symptoms: denies headaches, denies fevers, denies dysphagia, denies dysphonia, denies drooling, denies facial swelling, denies rash  Modifiers:  Chewing and eating exacerbates pain  Context: medical summary: review of past visit history via EPIC demonstrates patient last seen emergency department on 7/5/21 for community acquired pneumonia        History provided by:  Patient   used: No    Dental Pain  Associated symptoms: no congestion, no drooling, no facial swelling, no fever, no headaches, no neck pain and no oral lesions        Prior to Admission Medications   Prescriptions Last Dose Informant Patient Reported? Taking?    HYDROcodone-acetaminophen (NORCO) 5-325 mg per tablet   No No   Sig: Take 1 tablet by mouth every 6 (six) hours as needed for pain for up to 6 dosesMax Daily Amount: 4 tablets   Lidocaine Viscous HCl (XYLOCAINE) 2 % mucosal solution   No No   Sig: Swish and spit 15 mL 4 (four) times a day as needed for mouth pain or discomfort   Lidocaine Viscous HCl (XYLOCAINE) 2 % mucosal solution   No No   Sig: Swish and spit 15 mL 4 (four) times a day as needed for mouth pain or discomfort   Lidocaine Viscous HCl (XYLOCAINE) 2 % mucosal solution   No No   Sig: Swish and spit 15 mL 4 (four) times a day as needed for mouth pain or discomfort   albuterol (PROVENTIL HFA,VENTOLIN HFA) 90 mcg/act inhaler   No No   Sig: Inhale 2 puffs every 4 (four) hours as needed for wheezing   dicyclomine (BENTYL) 20 mg tablet   No No   Sig: Take 1 tablet every 6 hours for abdominal cramping   Patient not taking: Reported on 2020   ibuprofen (MOTRIN) 600 mg tablet   No No   Sig: Take 1 tablet by mouth every 6 (six) hours as needed for mild pain   Patient not taking: Reported on 2020   lidocaine (LIDODERM) 5 %   No No   Sig: Apply 1 patch topically daily Remove & Discard patch within 12 hours or as directed by MD   methocarbamol (ROBAXIN) 750 mg tablet   No No   Sig: Take 1 tablet (750 mg total) by mouth every 6 (six) hours as needed for muscle spasms   naproxen (NAPROSYN) 500 mg tablet   No No   Sig: Take 1 tablet (500 mg total) by mouth 2 (two) times a day with meals   predniSONE 5 mg tablet   No No   Si mg PO day 1, then 30 mg PO day 2, 20 mg PO day 3, 10 mg PO day 4, 5 mg PO day 5 then stop   valACYclovir (VALTREX) 500 mg tablet   No No   Sig: Take 1 tablet by mouth 2 (two) times a day for 3 days      Facility-Administered Medications: None       History reviewed  No pertinent past medical history  Past Surgical History:   Procedure Laterality Date    HYSTERECTOMY      PILONIDAL CYST EXCISION N/A 2020    Procedure: EXCISION PILONIDAL CYST;  Surgeon: Zenaida Bermeo MD;  Location: [facility] OR;  Service: General    TUBAL LIGATION      TUMOR REMOVAL      scalp        History reviewed  No pertinent family history  I have reviewed and agree with the history as documented  E-Cigarette/Vaping    E-Cigarette Use Never User      E-Cigarette/Vaping Substances    Nicotine No     THC No     CBD No     Flavoring No     Other No     Unknown No      Social History     Tobacco Use    Smoking status: Current Every Day Smoker     Packs/day: 0 20    Smokeless tobacco: Never Used   Vaping Use    Vaping Use: Never used   Substance Use Topics    Alcohol use: Yes     Comment: occ    Drug use: No       Review of Systems   Constitutional: Negative for activity change, appetite change, chills, diaphoresis, fatigue, fever and unexpected weight change     HENT: Positive for dental problem  Negative for congestion, drooling, ear discharge, ear pain, facial swelling, hearing loss, mouth sores, nosebleeds, postnasal drip, rhinorrhea, sinus pressure, sinus pain, sneezing, sore throat, tinnitus, trouble swallowing and voice change  Eyes: Negative for photophobia, pain, discharge, redness, itching and visual disturbance  Respiratory: Negative for apnea, cough, choking, chest tightness, shortness of breath, wheezing and stridor  Cardiovascular: Negative for chest pain, palpitations and leg swelling  Gastrointestinal: Negative for abdominal distention, abdominal pain, anal bleeding, blood in stool, constipation, diarrhea, nausea and vomiting  Endocrine: Negative for cold intolerance, heat intolerance, polydipsia, polyphagia and polyuria  Genitourinary: Negative for decreased urine volume, difficulty urinating, dysuria, flank pain, frequency, hematuria and urgency  Musculoskeletal: Negative for arthralgias, back pain, gait problem, joint swelling, myalgias, neck pain and neck stiffness  Skin: Negative for color change, pallor, rash and wound  Allergic/Immunologic: Negative for environmental allergies, food allergies and immunocompromised state  Neurological: Negative for dizziness, tremors, seizures, syncope, facial asymmetry, speech difficulty, weakness, light-headedness, numbness and headaches  Hematological: Negative for adenopathy  Does not bruise/bleed easily  Psychiatric/Behavioral: Negative for agitation, confusion, decreased concentration and hallucinations  The patient is not nervous/anxious  All other systems reviewed and are negative  Physical Exam  Physical Exam  Vitals and nursing note reviewed  Constitutional:       General: She is not in acute distress  Appearance: Normal appearance  She is well-developed  She is not diaphoretic        Comments: BP (!) 188/90 (BP Location: Left arm)   Pulse 91   Temp 97 9 °F (36 6 °C) (Oral) Resp 16   Wt 77 6 kg (171 lb 1 2 oz)   LMP  (LMP Unknown)   SpO2 98%   BMI 29 37 kg/m²      HENT:      Head: Normocephalic and atraumatic  Right Ear: External ear normal       Left Ear: External ear normal       Nose: Nose normal  No congestion or rhinorrhea  Mouth/Throat:      Pharynx: No oropharyngeal exudate or posterior oropharyngeal erythema  Comments: Patient almost completely edentulous  There are few remaining teeth  The left upper tooth demonstrates dental caries with area gingival erythema  No defined dental abscess  No trismus  No sublingual fullness or swelling  No trismus  Eyes:      General: No scleral icterus  Right eye: No discharge  Left eye: No discharge  Extraocular Movements: Extraocular movements intact  Conjunctiva/sclera: Conjunctivae normal    Neck:      Thyroid: No thyromegaly  Vascular: No JVD  Trachea: No tracheal deviation  Cardiovascular:      Rate and Rhythm: Normal rate and regular rhythm  Pulmonary:      Effort: Pulmonary effort is normal  No respiratory distress  Breath sounds: Normal breath sounds  No stridor  No wheezing or rales  Chest:      Chest wall: No tenderness  Abdominal:      General: Bowel sounds are normal  There is no distension  Palpations: Abdomen is soft  There is no mass  Tenderness: There is no abdominal tenderness  There is no guarding or rebound  Hernia: No hernia is present  Musculoskeletal:         General: No swelling, tenderness, deformity or signs of injury  Normal range of motion  Cervical back: Normal range of motion and neck supple  No rigidity or tenderness  Lymphadenopathy:      Cervical: No cervical adenopathy  Skin:     General: Skin is warm and dry  Capillary Refill: Capillary refill takes less than 2 seconds  Coloration: Skin is not pale  Findings: No erythema or rash  Neurological:      General: No focal deficit present  Mental Status: She is alert and oriented to person, place, and time  Mental status is at baseline  Cranial Nerves: No cranial nerve deficit  Sensory: No sensory deficit  Motor: No abnormal muscle tone  Coordination: Coordination normal       Deep Tendon Reflexes: Reflexes normal    Psychiatric:         Mood and Affect: Mood normal          Behavior: Behavior normal          Thought Content: Thought content normal          Judgment: Judgment normal          Vital Signs  ED Triage Vitals [08/08/21 0717]   Temperature Pulse Respirations Blood Pressure SpO2   97 9 °F (36 6 °C) 91 16 (!) 188/90 98 %      Temp Source Heart Rate Source Patient Position - Orthostatic VS BP Location FiO2 (%)   Oral Monitor Sitting Left arm --      Pain Score       Worst Possible Pain           Vitals:    08/08/21 0717   BP: (!) 188/90   Pulse: 91   Patient Position - Orthostatic VS: Sitting         Visual Acuity      ED Medications  Medications   ibuprofen (MOTRIN) tablet 400 mg (400 mg Oral Given 8/8/21 0749)       Diagnostic Studies  Results Reviewed     None                 No orders to display              Procedures  Procedures         ED Course                                           MDM  Number of Diagnoses or Management Options  Pain, dental: new and does not require workup  Diagnosis management comments: MDM:   Differential diagnosis includes but is not limited to dental fracture, tooth subluxation, tooth avulsion, dry socket,  dental abscess, consider but doubt Fredi's angina or submandibular infection  Discussed with patient diagnosis of dental pain  Discussed prophylactic treatment with penicillin for antibiotic coverage  Will treat with analgesic pain medication  Standard narcotic precautions were given  Follow up with primary care physician and dentist as soon as possible was discussed  Referrals were given    Reviewed reasons to return to the emergency department       Patient was seen during the outbreak of the corona virus epidemic   Resources are limited due to the severity of patient illnesses associated with virus   Testing is also limited at this time   Discussed with patient at the time of this evaluation   Due to the fact that limited resources are available -treatment options are limited  I have reasonably determine that electronically prescribing a controlled substance would be impractical for the patient to obtain the controlled substance prescribed by electronic prescription or would cause an untimely delay resulting in an adverse impact on the patient's medical condition   Amount and/or Complexity of Data Reviewed  Review and summarize past medical records: yes    Patient Progress  Patient progress: stable      Disposition  Final diagnoses:   Pain, dental     Time reflects when diagnosis was documented in both MDM as applicable and the Disposition within this note     Time User Action Codes Description Comment    8/8/2021  7:37 AM Bonnee Chiquito Add [K08 89] Toothache     8/8/2021  7:37 AM Bonnee Chiquito Add [K08 89] Pain, dental     8/8/2021  7:37 AM Bonnee Chiquito Modify [K08 89] Pain, dental     8/8/2021  7:37 AM Katrin Catalan [S00 85] Toothache       ED Disposition     ED Disposition Condition Date/Time Comment    Discharge Stable Sun Aug 8, 2021  7:37 AM Heather Weston discharge to home/self care              Follow-up Information     Follow up With Specialties Details Why Contact Info Additional Information    Sarah Beth Steinberg MD Family Medicine Call in 2 days for further evaluation of symptoms [address] [place] [address] [address] [facility] [id number]  [facility]  Emergency Department Emergency Medicine Go to  If symptoms worsen [address] [facility] Emergency Department, [address] [place] [address], 100 [facility]  Call in 1 day for further evaluation of symptoms [place] [address]  [phone]           Discharge Medication List as of 8/8/2021  7:39 AM      START taking these medications    Details   !! HYDROcodone-acetaminophen (NORCO) 5-325 mg per tablet Take 1 tablet by mouth every 6 (six) hours as needed for pain (dental pain/initial rx  )Max Daily Amount: 4 tablets, Starting Sun 8/8/2021, Normal      penicillin V potassium (VEETID) 500 mg tablet Take 1 tablet (500 mg total) by mouth 3 (three) times a day for 10 days, Starting Sun 8/8/2021, Until Wed 8/18/2021, Normal       !! - Potential duplicate medications found  Please discuss with provider  CONTINUE these medications which have NOT CHANGED    Details   albuterol (PROVENTIL HFA,VENTOLIN HFA) 90 mcg/act inhaler Inhale 2 puffs every 4 (four) hours as needed for wheezing, Starting Mon 3/25/2019, Print      dicyclomine (BENTYL) 20 mg tablet Take 1 tablet every 6 hours for abdominal cramping, Print      !! HYDROcodone-acetaminophen (NORCO) 5-325 mg per tablet Take 1 tablet by mouth every 6 (six) hours as needed for pain for up to 6 dosesMax Daily Amount: 4 tablets, Starting Sun 12/6/2020, Print      ibuprofen (MOTRIN) 600 mg tablet Take 1 tablet by mouth every 6 (six) hours as needed for mild pain, Starting Sat 11/11/2017, Print      lidocaine (LIDODERM) 5 % Apply 1 patch topically daily Remove & Discard patch within 12 hours or as directed by MD, Starting Mon 11/23/2020, Print      !! Lidocaine Viscous HCl (XYLOCAINE) 2 % mucosal solution Swish and spit 15 mL 4 (four) times a day as needed for mouth pain or discomfort, Starting Sun 12/6/2020, Print      !! Lidocaine Viscous HCl (XYLOCAINE) 2 % mucosal solution Swish and spit 15 mL 4 (four) times a day as needed for mouth pain or discomfort, Starting Sun 12/6/2020, Print      !!  Lidocaine Viscous HCl (XYLOCAINE) 2 % mucosal solution Swish and spit 15 mL 4 (four) times a day as needed for mouth pain or discomfort, Starting Sun 12/6/2020, Print      methocarbamol (ROBAXIN) 750 mg tablet Take 1 tablet (750 mg total) by mouth every 6 (six) hours as needed for muscle spasms, Starting Mon 11/23/2020, Print      naproxen (NAPROSYN) 500 mg tablet Take 1 tablet (500 mg total) by mouth 2 (two) times a day with meals, Starting Mon 11/23/2020, Print      predniSONE 5 mg tablet 40 mg PO day 1, then 30 mg PO day 2, 20 mg PO day 3, 10 mg PO day 4, 5 mg PO day 5 then stop, Print      valACYclovir (VALTREX) 500 mg tablet Take 1 tablet by mouth 2 (two) times a day for 3 days, Starting Sat 1/6/2018, Until Wed 5/27/2020, Print       !! - Potential duplicate medications found  Please discuss with provider  No discharge procedures on file      PDMP Review     None          ED Provider  Electronically Signed by           Anthony Patel PA-C  08/08/21 1249

## 2021-10-07 ENCOUNTER — HOSPITAL ENCOUNTER (EMERGENCY)
Facility: HOSPITAL | Age: 51
Discharge: HOME | End: 2021-10-07
Attending: EMERGENCY MEDICINE
Payer: COMMERCIAL

## 2021-10-07 ENCOUNTER — APPOINTMENT (EMERGENCY)
Dept: RADIOLOGY | Facility: HOSPITAL | Age: 51
End: 2021-10-07
Payer: COMMERCIAL

## 2021-10-07 VITALS
WEIGHT: 171.08 LBS | RESPIRATION RATE: 15 BRPM | HEART RATE: 85 BPM | OXYGEN SATURATION: 99 % | SYSTOLIC BLOOD PRESSURE: 141 MMHG | TEMPERATURE: 98 F | DIASTOLIC BLOOD PRESSURE: 88 MMHG | BODY MASS INDEX: 29.37 KG/M2

## 2021-10-07 DIAGNOSIS — S90.122A: Primary | ICD-10-CM

## 2021-10-07 PROCEDURE — 99283 EMERGENCY DEPT VISIT LOW MDM: CPT

## 2021-10-07 PROCEDURE — 99284 EMERGENCY DEPT VISIT MOD MDM: CPT | Performed by: EMERGENCY MEDICINE

## 2021-10-07 PROCEDURE — 73660 X-RAY EXAM OF TOE(S): CPT

## 2021-10-07 RX ORDER — IBUPROFEN 600 MG/1
600 TABLET ORAL ONCE
Status: COMPLETED | OUTPATIENT
Start: 2021-10-07 | End: 2021-10-07

## 2021-10-07 RX ADMIN — IBUPROFEN 600 MG: 600 TABLET ORAL at 08:27

## 2021-12-29 ENCOUNTER — HOSPITAL ENCOUNTER (EMERGENCY)
Facility: HOSPITAL | Age: 51
Discharge: HOME | End: 2021-12-29
Attending: EMERGENCY MEDICINE
Payer: COMMERCIAL

## 2021-12-29 VITALS
TEMPERATURE: 97.1 F | HEART RATE: 100 BPM | SYSTOLIC BLOOD PRESSURE: 135 MMHG | OXYGEN SATURATION: 97 % | RESPIRATION RATE: 16 BRPM | DIASTOLIC BLOOD PRESSURE: 63 MMHG

## 2021-12-29 DIAGNOSIS — U07.1 COVID-19: Primary | ICD-10-CM

## 2021-12-29 LAB
ALBUMIN SERPL BCP-MCNC: 3.3 G/DL (ref 3.5–5)
ALP SERPL-CCNC: 98 U/L (ref 46–116)
ALT SERPL W P-5'-P-CCNC: 19 U/L (ref 12–78)
ANION GAP SERPL CALCULATED.3IONS-SCNC: 10 MMOL/L (ref 4–13)
AST SERPL W P-5'-P-CCNC: 9 U/L (ref 5–45)
ATRIAL RATE: 98 BPM
BASOPHILS # BLD AUTO: 0.04 THOUSANDS/ΜL (ref 0–0.1)
BASOPHILS NFR BLD AUTO: 1 % (ref 0–1)
BILIRUB SERPL-MCNC: 0.18 MG/DL (ref 0.2–1)
BUN SERPL-MCNC: 15 MG/DL (ref 5–25)
CALCIUM ALBUM COR SERPL-MCNC: 9 MG/DL (ref 8.3–10.1)
CALCIUM SERPL-MCNC: 8.4 MG/DL (ref 8.3–10.1)
CARDIAC TROPONIN I PNL SERPL HS: 2 NG/L
CHLORIDE SERPL-SCNC: 106 MMOL/L (ref 100–108)
CO2 SERPL-SCNC: 25 MMOL/L (ref 21–32)
CREAT SERPL-MCNC: 0.71 MG/DL (ref 0.6–1.3)
EOSINOPHIL # BLD AUTO: 0.11 THOUSAND/ΜL (ref 0–0.61)
EOSINOPHIL NFR BLD AUTO: 3 % (ref 0–6)
ERYTHROCYTE [DISTWIDTH] IN BLOOD BY AUTOMATED COUNT: 14 % (ref 11.6–15.1)
FLUAV RNA RESP QL NAA+PROBE: NEGATIVE
FLUBV RNA RESP QL NAA+PROBE: NEGATIVE
GFR SERPL CREATININE-BSD FRML MDRD: 98 ML/MIN/1.73SQ M
GLUCOSE SERPL-MCNC: 129 MG/DL (ref 65–140)
HCT VFR BLD AUTO: 37.5 % (ref 34.8–46.1)
HGB BLD-MCNC: 12.2 G/DL (ref 11.5–15.4)
IMM GRANULOCYTES # BLD AUTO: 0.01 THOUSAND/UL (ref 0–0.2)
IMM GRANULOCYTES NFR BLD AUTO: 0 % (ref 0–2)
LYMPHOCYTES # BLD AUTO: 0.92 THOUSANDS/ΜL (ref 0.6–4.47)
LYMPHOCYTES NFR BLD AUTO: 25 % (ref 14–44)
MCH RBC QN AUTO: 27.8 PG (ref 26.8–34.3)
MCHC RBC AUTO-ENTMCNC: 32.5 G/DL (ref 31.4–37.4)
MCV RBC AUTO: 85 FL (ref 82–98)
MONOCYTES # BLD AUTO: 0.69 THOUSAND/ΜL (ref 0.17–1.22)
MONOCYTES NFR BLD AUTO: 19 % (ref 4–12)
NEUTROPHILS # BLD AUTO: 1.96 THOUSANDS/ΜL (ref 1.85–7.62)
NEUTS SEG NFR BLD AUTO: 52 % (ref 43–75)
NRBC BLD AUTO-RTO: 0 /100 WBCS
P AXIS: 51 DEGREES
PLATELET # BLD AUTO: 188 THOUSANDS/UL (ref 149–390)
PMV BLD AUTO: 11.1 FL (ref 8.9–12.7)
POTASSIUM SERPL-SCNC: 3 MMOL/L (ref 3.5–5.3)
PR INTERVAL: 140 MS
PROT SERPL-MCNC: 7.1 G/DL (ref 6.4–8.2)
QRS AXIS: 57 DEGREES
QRSD INTERVAL: 80 MS
QT INTERVAL: 360 MS
QTC INTERVAL: 459 MS
RBC # BLD AUTO: 4.39 MILLION/UL (ref 3.81–5.12)
RSV RNA RESP QL NAA+PROBE: NEGATIVE
SARS-COV-2 RNA RESP QL NAA+PROBE: POSITIVE
SODIUM SERPL-SCNC: 141 MMOL/L (ref 136–145)
T WAVE AXIS: 45 DEGREES
VENTRICULAR RATE: 98 BPM
WBC # BLD AUTO: 3.73 THOUSAND/UL (ref 4.31–10.16)

## 2021-12-29 PROCEDURE — 0241U HB NFCT DS VIR RESP RNA 4 TRGT: CPT | Performed by: EMERGENCY MEDICINE

## 2021-12-29 PROCEDURE — 85025 COMPLETE CBC W/AUTO DIFF WBC: CPT | Performed by: EMERGENCY MEDICINE

## 2021-12-29 PROCEDURE — 36415 COLL VENOUS BLD VENIPUNCTURE: CPT

## 2021-12-29 PROCEDURE — 93010 ELECTROCARDIOGRAM REPORT: CPT | Performed by: INTERNAL MEDICINE

## 2021-12-29 PROCEDURE — 99284 EMERGENCY DEPT VISIT MOD MDM: CPT | Performed by: EMERGENCY MEDICINE

## 2021-12-29 PROCEDURE — 93005 ELECTROCARDIOGRAM TRACING: CPT

## 2021-12-29 PROCEDURE — 84484 ASSAY OF TROPONIN QUANT: CPT | Performed by: EMERGENCY MEDICINE

## 2021-12-29 PROCEDURE — 80053 COMPREHEN METABOLIC PANEL: CPT | Performed by: EMERGENCY MEDICINE

## 2021-12-29 PROCEDURE — 99284 EMERGENCY DEPT VISIT MOD MDM: CPT

## 2021-12-29 RX ORDER — METOCLOPRAMIDE 10 MG/1
10 TABLET ORAL EVERY 6 HOURS PRN
Qty: 20 TABLET | Refills: 0 | Status: SHIPPED | OUTPATIENT
Start: 2021-12-29

## 2021-12-29 RX ORDER — METOCLOPRAMIDE 10 MG/1
10 TABLET ORAL ONCE
Status: COMPLETED | OUTPATIENT
Start: 2021-12-29 | End: 2021-12-29

## 2021-12-29 RX ORDER — METOCLOPRAMIDE 10 MG/1
10 TABLET ORAL EVERY 6 HOURS PRN
Qty: 20 TABLET | Refills: 0 | Status: SHIPPED | OUTPATIENT
Start: 2021-12-29 | End: 2021-12-29 | Stop reason: SDUPTHER

## 2021-12-29 RX ORDER — ALBUTEROL SULFATE 90 UG/1
2 AEROSOL, METERED RESPIRATORY (INHALATION) EVERY 6 HOURS PRN
Qty: 8.5 G | Refills: 0 | Status: SHIPPED | OUTPATIENT
Start: 2021-12-29

## 2021-12-29 RX ORDER — NAPROXEN 250 MG/1
500 TABLET ORAL ONCE
Status: COMPLETED | OUTPATIENT
Start: 2021-12-29 | End: 2021-12-29

## 2021-12-29 RX ADMIN — NAPROXEN 500 MG: 250 TABLET ORAL at 18:59

## 2021-12-29 RX ADMIN — DEXAMETHASONE SODIUM PHOSPHATE 10 MG: 10 INJECTION, SOLUTION INTRAMUSCULAR; INTRAVENOUS at 18:59

## 2021-12-29 RX ADMIN — METOCLOPRAMIDE 10 MG: 10 TABLET ORAL at 18:59

## 2021-12-29 NOTE — Clinical Note
Farhana Hernandez was seen and treated in our emergency department on 12/29/2021  Diagnosis:     Tre Chapman  may return to work on return date  She may return on this date: 01/06/2022    Sadie tested positive for COVID19 on 12/29/21  She will need to quarantine/isolate for 10 days from the onset of her symptoms  She should also be fever free prior to returning to work  If you have any questions or concerns, please don't hesitate to call        Sourav Croft MD    ______________________________           _______________          _______________  Hospital Representative                              Date                                Time

## 2021-12-30 ENCOUNTER — TELEPHONE (OUTPATIENT)
Dept: FAMILY MEDICINE CLINIC | Facility: CLINIC | Age: 51
End: 2021-12-30

## 2021-12-30 NOTE — ED PROVIDER NOTES
History  Chief Complaint   Patient presents with    Chest Pain     pt c/o chest tightness, dizziness, cough, fatigue that started yesterday and worsened today  History provided by:  Patient   used: No    Flu Symptoms  Presenting symptoms: cough, fatigue, myalgias and sore throat    Presenting symptoms: no fever, no nausea, no shortness of breath and no vomiting    Severity:  Moderate  Onset quality:  Gradual  Progression:  Worsening  Chronicity:  New  Relieved by:  Nothing  Worsened by:  Nothing  Ineffective treatments:  None tried  Associated symptoms: no chills and no ear pain        Prior to Admission Medications   Prescriptions Last Dose Informant Patient Reported? Taking? HYDROcodone-acetaminophen (NORCO) 5-325 mg per tablet   No No   Sig: Take 1 tablet by mouth every 6 (six) hours as needed for pain for up to 6 dosesMax Daily Amount: 4 tablets   HYDROcodone-acetaminophen (NORCO) 5-325 mg per tablet   No No   Sig: Take 1 tablet by mouth every 6 (six) hours as needed for pain (dental pain/initial rx  )Max Daily Amount: 4 tablets   Lidocaine Viscous HCl (XYLOCAINE) 2 % mucosal solution   No No   Sig: Swish and spit 15 mL 4 (four) times a day as needed for mouth pain or discomfort   Lidocaine Viscous HCl (XYLOCAINE) 2 % mucosal solution   No No   Sig: Swish and spit 15 mL 4 (four) times a day as needed for mouth pain or discomfort   Lidocaine Viscous HCl (XYLOCAINE) 2 % mucosal solution   No No   Sig: Swish and spit 15 mL 4 (four) times a day as needed for mouth pain or discomfort   albuterol (PROVENTIL HFA,VENTOLIN HFA) 90 mcg/act inhaler   No No   Sig: Inhale 2 puffs every 4 (four) hours as needed for wheezing   dicyclomine (BENTYL) 20 mg tablet   No No   Sig: Take 1 tablet every 6 hours for abdominal cramping   Patient not taking: Reported on 5/17/2020   ibuprofen (MOTRIN) 600 mg tablet   No No   Sig: Take 1 tablet by mouth every 6 (six) hours as needed for mild pain   Patient not taking: Reported on 2020   lidocaine (LIDODERM) 5 %   No No   Sig: Apply 1 patch topically daily Remove & Discard patch within 12 hours or as directed by MD   methocarbamol (ROBAXIN) 750 mg tablet   No No   Sig: Take 1 tablet (750 mg total) by mouth every 6 (six) hours as needed for muscle spasms   naproxen (NAPROSYN) 500 mg tablet   No No   Sig: Take 1 tablet (500 mg total) by mouth 2 (two) times a day with meals   predniSONE 5 mg tablet   No No   Si mg PO day 1, then 30 mg PO day 2, 20 mg PO day 3, 10 mg PO day 4, 5 mg PO day 5 then stop   valACYclovir (VALTREX) 500 mg tablet   No No   Sig: Take 1 tablet by mouth 2 (two) times a day for 3 days      Facility-Administered Medications: None       History reviewed  No pertinent past medical history  Past Surgical History:   Procedure Laterality Date    HYSTERECTOMY      PILONIDAL CYST EXCISION N/A 2020    Procedure: EXCISION PILONIDAL CYST;  Surgeon: Linda Beasley MD;  Location: [facility];  Service: General    TUBAL LIGATION      TUMOR REMOVAL      scalp        History reviewed  No pertinent family history  I have reviewed and agree with the history as documented  E-Cigarette/Vaping    E-Cigarette Use Never User      E-Cigarette/Vaping Substances    Nicotine No     THC No     CBD No     Flavoring No     Other No     Unknown No      Social History     Tobacco Use    Smoking status: Current Every Day Smoker     Packs/day: 0 20    Smokeless tobacco: Never Used   Vaping Use    Vaping Use: Never used   Substance Use Topics    Alcohol use: Yes     Comment: occ    Drug use: No       Review of Systems   Constitutional: Positive for fatigue  Negative for chills and fever  HENT: Positive for sore throat  Negative for ear pain  Eyes: Negative for pain and visual disturbance  Respiratory: Positive for cough  Negative for shortness of breath  Cardiovascular: Negative for chest pain and palpitations     Gastrointestinal: Negative for abdominal pain, nausea and vomiting  Genitourinary: Negative for dysuria and hematuria  Musculoskeletal: Positive for myalgias  Negative for arthralgias and back pain  Skin: Negative for color change and rash  Neurological: Negative for seizures and syncope  All other systems reviewed and are negative  Physical Exam  Physical Exam  Vitals and nursing note reviewed  Constitutional:       General: She is not in acute distress  Appearance: She is well-developed  HENT:      Head: Normocephalic and atraumatic  Eyes:      Conjunctiva/sclera: Conjunctivae normal    Cardiovascular:      Rate and Rhythm: Normal rate and regular rhythm  Heart sounds: No murmur heard  Pulmonary:      Effort: Pulmonary effort is normal  No respiratory distress  Breath sounds: Normal breath sounds  Abdominal:      Palpations: Abdomen is soft  Tenderness: There is no abdominal tenderness  Musculoskeletal:      Cervical back: Neck supple  Right lower leg: No tenderness  Left lower leg: No tenderness  Skin:     General: Skin is warm and dry  Capillary Refill: Capillary refill takes less than 2 seconds  Neurological:      General: No focal deficit present  Mental Status: She is alert and oriented to person, place, and time           Vital Signs  ED Triage Vitals [12/29/21 1615]   Temperature Pulse Respirations Blood Pressure SpO2   (!) 97 1 °F (36 2 °C) 100 16 135/63 97 %      Temp Source Heart Rate Source Patient Position - Orthostatic VS BP Location FiO2 (%)   Temporal Monitor Sitting Left arm --      Pain Score       --           Vitals:    12/29/21 1615   BP: 135/63   Pulse: 100   Patient Position - Orthostatic VS: Sitting         Visual Acuity      ED Medications  Medications   dexamethasone oral liquid 10 mg 1 mL (10 mg Oral Given 12/29/21 1859)   metoclopramide (REGLAN) tablet 10 mg (10 mg Oral Given 12/29/21 1859)   naproxen (NAPROSYN) tablet 500 mg (500 mg Oral Given 12/29/21 1859)       Diagnostic Studies  Results Reviewed     Procedure Component Value Units Date/Time    HS Troponin I 4hr [[id number]]     Lab Status: No result Specimen: Blood     COVID/FLU/RSV [[id number]]  (Abnormal) Collected: 12/29/21 1622    Lab Status: Final result Specimen: Nares from Nose Updated: 12/29/21 1728     SARS-CoV-2 Positive     INFLUENZA A PCR Negative     INFLUENZA B PCR Negative     RSV PCR Negative    Narrative:      FOR PEDIATRIC PATIENTS - copy/paste COVID Guidelines URL to browser: [email or web address]  [employer]el [employer]     This test has been authorized by FDA under an EUA (Emergency Use Assay) for use by authorized laboratories  Clinical caution and judgement should be used with the interpretation of these results with consideration of the clinical impression and other laboratory testing  Testing reported as "Positive" or "Negative" has been proven to be accurate according to standard laboratory validation requirements  All testing is performed with control materials showing appropriate reactivity at standard intervals      HS Troponin 0hr (reflex protocol) [[id number]]  (Normal) Collected: 12/29/21 1621    Lab Status: Final result Specimen: Blood from Arm, Left Updated: 12/29/21 1703     hs TnI 0hr 2 ng/L     HS Troponin I 2hr [[id number]]     Lab Status: No result Specimen: Blood     Comprehensive metabolic panel [[id number]]  (Abnormal) Collected: 12/29/21 1621    Lab Status: Final result Specimen: Blood from Arm, Left Updated: 12/29/21 1657     Sodium 141 mmol/L      Potassium 3 0 mmol/L      Chloride 106 mmol/L      CO2 25 mmol/L      ANION GAP 10 mmol/L      BUN 15 mg/dL      Creatinine 0 71 mg/dL      Glucose 129 mg/dL      Calcium 8 4 mg/dL      Corrected Calcium 9 0 mg/dL      AST 9 U/L      ALT 19 U/L      Alkaline Phosphatase 98 U/L      Total Protein 7 1 g/dL      Albumin 3 3 g/dL      Total Bilirubin 0 18 mg/dL      eGFR 98 ml/min/1 73sq m     Narrative:      National Kidney Disease Foundation guidelines for Chronic Kidney Disease (CKD):     Stage 1 with normal or high GFR (GFR > 90 mL/min/1 73 square meters)    Stage 2 Mild CKD (GFR = 60-89 mL/min/1 73 square meters)    Stage 3A Moderate CKD (GFR = 45-59 mL/min/1 73 square meters)    Stage 3B Moderate CKD (GFR = 30-44 mL/min/1 73 square meters)    Stage 4 Severe CKD (GFR = 15-29 mL/min/1 73 square meters)    Stage 5 End Stage CKD (GFR <15 mL/min/1 73 square meters)  Note: GFR calculation is accurate only with a steady state creatinine    CBC and differential [[id number]]  (Abnormal) Collected: 12/29/21 1621    Lab Status: Final result Specimen: Blood from Arm, Left Updated: 12/29/21 1642     WBC 3 73 Thousand/uL      RBC 4 39 Million/uL      Hemoglobin 12 2 g/dL      Hematocrit 37 5 %      MCV 85 fL      MCH 27 8 pg      MCHC 32 5 g/dL      RDW 14 0 %      MPV 11 1 fL      Platelets 475 Thousands/uL      nRBC 0 /100 WBCs      Neutrophils Relative 52 %      Immat GRANS % 0 %      Lymphocytes Relative 25 %      Monocytes Relative 19 %      Eosinophils Relative 3 %      Basophils Relative 1 %      Neutrophils Absolute 1 96 Thousands/µL      Immature Grans Absolute 0 01 Thousand/uL      Lymphocytes Absolute 0 92 Thousands/µL      Monocytes Absolute 0 69 Thousand/µL      Eosinophils Absolute 0 11 Thousand/µL      Basophils Absolute 0 04 Thousands/µL                  No orders to display              Procedures  Procedures         ED Course                                             MDM  Number of Diagnoses or Management Options  COVID-19: new and requires workup  Diagnosis management comments: 44 yo F with flu-like illness for 2 days now  She describes general fatigue/malaise, myalgias, sore throat, dry cough, subjective intermittent fevers  She denies overt chest pain or shortness of breath  No abdominal pain, nausea, vomiting    States that she has been eating and drinking normally and urinating normally  COVID test today was positive  Patient is also complaining of headache but is afebrile here in the emergency department  Will plan to treat her symptoms and discharged to follow-up with PCP  Will forward patient's chart to COVID follow-up  Discussed quarantine/isolation protocols and return precautions  Amount and/or Complexity of Data Reviewed  Clinical lab tests: reviewed and ordered  Tests in the radiology section of CPT®: reviewed and ordered  Review and summarize past medical records: yes  Discuss the patient with other providers: yes  Independent visualization of images, tracings, or specimens: yes        Disposition  Final diagnoses:   COVID-19     Time reflects when diagnosis was documented in both MDM as applicable and the Disposition within this note     Time User Action Codes Description Comment    12/29/2021  6:46 PM Jerson Pace Add [U07 1] COVID-19       ED Disposition     ED Disposition Condition Date/Time Comment    Discharge Stable Wed Dec 29, 2021  6:46 PM Sadie Bazzi discharge to home/self care  Follow-up Information     Follow up With Specialties Details Why Contact Info    Emil Echavarria MD Family Medicine   [address] [place] [address]  [phone]            Discharge Medication List as of 12/29/2021  6:49 PM      START taking these medications    Details   !! albuterol (ProAir HFA) 90 mcg/act inhaler Inhale 2 puffs every 6 (six) hours as needed for wheezing, Starting Wed 12/29/2021, Normal      metoclopramide (REGLAN) 10 mg tablet Take 1 tablet (10 mg total) by mouth every 6 (six) hours as needed (n/v or headache), Starting Wed 12/29/2021, Print       !! - Potential duplicate medications found  Please discuss with provider        CONTINUE these medications which have NOT CHANGED    Details   !! albuterol (PROVENTIL HFA,VENTOLIN HFA) 90 mcg/act inhaler Inhale 2 puffs every 4 (four) hours as needed for wheezing, Starting Mon 3/25/2019, Print      dicyclomine (BENTYL) 20 mg tablet Take 1 tablet every 6 hours for abdominal cramping, Print      !! HYDROcodone-acetaminophen (NORCO) 5-325 mg per tablet Take 1 tablet by mouth every 6 (six) hours as needed for pain for up to 6 dosesMax Daily Amount: 4 tablets, Starting Sun 12/6/2020, Print      !! HYDROcodone-acetaminophen (NORCO) 5-325 mg per tablet Take 1 tablet by mouth every 6 (six) hours as needed for pain (dental pain/initial rx  )Max Daily Amount: 4 tablets, Starting Sun 8/8/2021, Normal      ibuprofen (MOTRIN) 600 mg tablet Take 1 tablet by mouth every 6 (six) hours as needed for mild pain, Starting Sat 11/11/2017, Print      lidocaine (LIDODERM) 5 % Apply 1 patch topically daily Remove & Discard patch within 12 hours or as directed by MD, Starting Mon 11/23/2020, Print      !! Lidocaine Viscous HCl (XYLOCAINE) 2 % mucosal solution Swish and spit 15 mL 4 (four) times a day as needed for mouth pain or discomfort, Starting Sun 12/6/2020, Print      !! Lidocaine Viscous HCl (XYLOCAINE) 2 % mucosal solution Swish and spit 15 mL 4 (four) times a day as needed for mouth pain or discomfort, Starting Sun 12/6/2020, Print      !! Lidocaine Viscous HCl (XYLOCAINE) 2 % mucosal solution Swish and spit 15 mL 4 (four) times a day as needed for mouth pain or discomfort, Starting Sun 12/6/2020, Print      methocarbamol (ROBAXIN) 750 mg tablet Take 1 tablet (750 mg total) by mouth every 6 (six) hours as needed for muscle spasms, Starting Mon 11/23/2020, Print      naproxen (NAPROSYN) 500 mg tablet Take 1 tablet (500 mg total) by mouth 2 (two) times a day with meals, Starting Mon 11/23/2020, Print      predniSONE 5 mg tablet 40 mg PO day 1, then 30 mg PO day 2, 20 mg PO day 3, 10 mg PO day 4, 5 mg PO day 5 then stop, Print      valACYclovir (VALTREX) 500 mg tablet Take 1 tablet by mouth 2 (two) times a day for 3 days, Starting Sat 1/6/2018, Until Wed 5/27/2020, Print       ! ! - Potential duplicate medications found  Please discuss with provider  No discharge procedures on file      PDMP Review     None          ED Provider  Electronically Signed by           aDv Celestin MD  12/29/21 2027

## 2022-11-03 ENCOUNTER — APPOINTMENT (EMERGENCY)
Dept: RADIOLOGY | Facility: HOSPITAL | Age: 52
End: 2022-11-03

## 2022-11-03 ENCOUNTER — HOSPITAL ENCOUNTER (EMERGENCY)
Facility: HOSPITAL | Age: 52
Discharge: HOME | End: 2022-11-03
Attending: EMERGENCY MEDICINE

## 2022-11-03 VITALS
TEMPERATURE: 98.6 F | OXYGEN SATURATION: 99 % | HEART RATE: 90 BPM | DIASTOLIC BLOOD PRESSURE: 78 MMHG | RESPIRATION RATE: 18 BRPM | SYSTOLIC BLOOD PRESSURE: 154 MMHG

## 2022-11-03 DIAGNOSIS — B34.9 VIRAL ILLNESS: Primary | ICD-10-CM

## 2022-11-03 LAB
ALBUMIN SERPL BCP-MCNC: 3.4 G/DL (ref 3.5–5)
ALP SERPL-CCNC: 99 U/L (ref 46–116)
ALT SERPL W P-5'-P-CCNC: 21 U/L (ref 12–78)
ANION GAP SERPL CALCULATED.3IONS-SCNC: 9 MMOL/L (ref 4–13)
AST SERPL W P-5'-P-CCNC: 17 U/L (ref 5–45)
ATRIAL RATE: 84 BPM
BASOPHILS # BLD AUTO: 0.05 THOUSANDS/ÂΜL (ref 0–0.1)
BASOPHILS NFR BLD AUTO: 1 % (ref 0–1)
BILIRUB SERPL-MCNC: 0.21 MG/DL (ref 0.2–1)
BUN SERPL-MCNC: 17 MG/DL (ref 5–25)
CALCIUM ALBUM COR SERPL-MCNC: 9.2 MG/DL (ref 8.3–10.1)
CALCIUM SERPL-MCNC: 8.7 MG/DL (ref 8.3–10.1)
CARDIAC TROPONIN I PNL SERPL HS: <2 NG/L
CHLORIDE SERPL-SCNC: 104 MMOL/L (ref 96–108)
CO2 SERPL-SCNC: 29 MMOL/L (ref 21–32)
CREAT SERPL-MCNC: 0.55 MG/DL (ref 0.6–1.3)
EOSINOPHIL # BLD AUTO: 0.18 THOUSAND/ÂΜL (ref 0–0.61)
EOSINOPHIL NFR BLD AUTO: 2 % (ref 0–6)
ERYTHROCYTE [DISTWIDTH] IN BLOOD BY AUTOMATED COUNT: 14.5 % (ref 11.6–15.1)
FLUAV RNA RESP QL NAA+PROBE: NEGATIVE
FLUBV RNA RESP QL NAA+PROBE: NEGATIVE
GFR SERPL CREATININE-BSD FRML MDRD: 107 ML/MIN/1.73SQ M
GLUCOSE SERPL-MCNC: 109 MG/DL (ref 65–140)
HCT VFR BLD AUTO: 34.8 % (ref 34.8–46.1)
HGB BLD-MCNC: 11.4 G/DL (ref 11.5–15.4)
IMM GRANULOCYTES # BLD AUTO: 0.03 THOUSAND/UL (ref 0–0.2)
IMM GRANULOCYTES NFR BLD AUTO: 0 % (ref 0–2)
LYMPHOCYTES # BLD AUTO: 2.48 THOUSANDS/ÂΜL (ref 0.6–4.47)
LYMPHOCYTES NFR BLD AUTO: 28 % (ref 14–44)
MCH RBC QN AUTO: 27.9 PG (ref 26.8–34.3)
MCHC RBC AUTO-ENTMCNC: 32.8 G/DL (ref 31.4–37.4)
MCV RBC AUTO: 85 FL (ref 82–98)
MONOCYTES # BLD AUTO: 0.83 THOUSAND/ÂΜL (ref 0.17–1.22)
MONOCYTES NFR BLD AUTO: 9 % (ref 4–12)
NEUTROPHILS # BLD AUTO: 5.46 THOUSANDS/ÂΜL (ref 1.85–7.62)
NEUTS SEG NFR BLD AUTO: 60 % (ref 43–75)
NRBC BLD AUTO-RTO: 0 /100 WBCS
P AXIS: 31 DEGREES
PLATELET # BLD AUTO: 193 THOUSANDS/UL (ref 149–390)
PMV BLD AUTO: 11.1 FL (ref 8.9–12.7)
POTASSIUM SERPL-SCNC: 3.6 MMOL/L (ref 3.5–5.3)
PR INTERVAL: 138 MS
PROT SERPL-MCNC: 7.5 G/DL (ref 6.4–8.4)
QRS AXIS: 14 DEGREES
QRSD INTERVAL: 82 MS
QT INTERVAL: 386 MS
QTC INTERVAL: 456 MS
RBC # BLD AUTO: 4.09 MILLION/UL (ref 3.81–5.12)
RSV RNA RESP QL NAA+PROBE: NEGATIVE
SARS-COV-2 RNA RESP QL NAA+PROBE: NEGATIVE
SODIUM SERPL-SCNC: 142 MMOL/L (ref 135–147)
T WAVE AXIS: 18 DEGREES
VENTRICULAR RATE: 84 BPM
WBC # BLD AUTO: 9.03 THOUSAND/UL (ref 4.31–10.16)

## 2022-11-03 RX ORDER — ALBUTEROL SULFATE 90 UG/1
2 AEROSOL, METERED RESPIRATORY (INHALATION) ONCE
Status: COMPLETED | OUTPATIENT
Start: 2022-11-03 | End: 2022-11-03

## 2022-11-03 RX ADMIN — ALBUTEROL SULFATE 2 PUFF: 90 AEROSOL, METERED RESPIRATORY (INHALATION) at 20:45

## 2022-11-03 NOTE — ED NOTES
[facility] Per[facility]  11/03/22 0626

## 2022-11-03 NOTE — Clinical Note
Gerard Rutledge was seen and treated in our emergency department on 11/3/2022  No restrictions            Diagnosis:     Charmayne Patty  may return to work on return date  She may return on this date: 11/06/2022         If you have any questions or concerns, please don't hesitate to call        Alanson Shone, RN    ______________________________           _______________          _______________  [facility] Representative                              Date                                Time

## 2022-11-04 NOTE — ED PROVIDER NOTES
History  Chief Complaint   Patient presents with   • Chest Pain     Reports pain w/ inhalation  C/o sob  Productive cough w/ sputum  Symptoms started yesterday  Adolfo Martinez is a 47 y o  female with no pertinent past medical history presenting today with cough, congestion, shortness of breath  Patient reports that symptoms started yesterday and have been constant since onset  Patient has had exposure to other sick contacts  Has not taken anything for symptoms at home  Denies any lightheadedness, dizziness, syncopal episodes, nausea/vomiting/diarrhea/diaphoresis  No further complaints at this time  Prior to Admission Medications   Prescriptions Last Dose Informant Patient Reported? Taking? HYDROcodone-acetaminophen (NORCO) 5-325 mg per tablet   No No   Sig: Take 1 tablet by mouth every 6 (six) hours as needed for pain for up to 6 dosesMax Daily Amount: 4 tablets   HYDROcodone-acetaminophen (NORCO) 5-325 mg per tablet   No No   Sig: Take 1 tablet by mouth every 6 (six) hours as needed for pain (dental pain/initial rx  )Max Daily Amount: 4 tablets   Lidocaine Viscous HCl (XYLOCAINE) 2 % mucosal solution   No No   Sig: Swish and spit 15 mL 4 (four) times a day as needed for mouth pain or discomfort   Lidocaine Viscous HCl (XYLOCAINE) 2 % mucosal solution   No No   Sig: Swish and spit 15 mL 4 (four) times a day as needed for mouth pain or discomfort   Lidocaine Viscous HCl (XYLOCAINE) 2 % mucosal solution   No No   Sig: Swish and spit 15 mL 4 (four) times a day as needed for mouth pain or discomfort   albuterol (PROVENTIL HFA,VENTOLIN HFA) 90 mcg/act inhaler   No No   Sig: Inhale 2 puffs every 4 (four) hours as needed for wheezing   albuterol (ProAir HFA) 90 mcg/act inhaler   No No   Sig: Inhale 2 puffs every 6 (six) hours as needed for wheezing   dicyclomine (BENTYL) 20 mg tablet   No No   Sig: Take 1 tablet every 6 hours for abdominal cramping   Patient not taking: Reported on 5/17/2020   ibuprofen (MOTRIN) 600 mg tablet   No No   Sig: Take 1 tablet by mouth every 6 (six) hours as needed for mild pain   Patient not taking: Reported on 2020   lidocaine (LIDODERM) 5 %   No No   Sig: Apply 1 patch topically daily Remove & Discard patch within 12 hours or as directed by MD   methocarbamol (ROBAXIN) 750 mg tablet   No No   Sig: Take 1 tablet (750 mg total) by mouth every 6 (six) hours as needed for muscle spasms   metoclopramide (REGLAN) 10 mg tablet   No No   Sig: Take 1 tablet (10 mg total) by mouth every 6 (six) hours as needed (n/v or headache)   naproxen (NAPROSYN) 500 mg tablet   No No   Sig: Take 1 tablet (500 mg total) by mouth 2 (two) times a day with meals   predniSONE 5 mg tablet   No No   Si mg PO day 1, then 30 mg PO day 2, 20 mg PO day 3, 10 mg PO day 4, 5 mg PO day 5 then stop   valACYclovir (VALTREX) 500 mg tablet   No No   Sig: Take 1 tablet by mouth 2 (two) times a day for 3 days      Facility-Administered Medications: None       History reviewed  No pertinent past medical history  Past Surgical History:   Procedure Laterality Date   • HYSTERECTOMY     • PILONIDAL CYST EXCISION N/A 2020    Procedure: EXCISION PILONIDAL CYST;  Surgeon: Ramya Severino MD;  Location: [facility];  Service: General   • TUBAL LIGATION     • TUMOR REMOVAL      scalp        History reviewed  No pertinent family history  I have reviewed and agree with the history as documented  E-Cigarette/Vaping   • E-Cigarette Use Never User      E-Cigarette/Vaping Substances   • Nicotine No    • THC No    • CBD No    • Flavoring No    • Other No    • Unknown No      Social History     Tobacco Use   • Smoking status: Current Every Day Smoker     Packs/day: 0 20   • Smokeless tobacco: Never Used   Vaping Use   • Vaping Use: Never used   Substance Use Topics   • Alcohol use: Yes     Comment: occ   • Drug use: No       Review of Systems   Constitutional: Negative for chills and fever  HENT: Positive for congestion  Negative for ear pain and sore throat  Eyes: Negative for pain and visual disturbance  Respiratory: Positive for cough and shortness of breath  Cardiovascular: Negative for chest pain and palpitations  Gastrointestinal: Negative for abdominal pain and vomiting  Genitourinary: Negative for dysuria and hematuria  Musculoskeletal: Negative for arthralgias and back pain  Skin: Negative for color change and rash  Neurological: Negative for seizures and syncope  All other systems reviewed and are negative  Physical Exam  Physical Exam  Vitals and nursing note reviewed  Constitutional:       General: She is not in acute distress  Appearance: Normal appearance  She is well-developed  She is not ill-appearing  HENT:      Head: Normocephalic and atraumatic  Right Ear: Tympanic membrane normal       Left Ear: Tympanic membrane normal       Nose: Nose normal  No congestion or rhinorrhea  Mouth/Throat:      Mouth: Mucous membranes are moist       Pharynx: Oropharynx is clear  No oropharyngeal exudate or posterior oropharyngeal erythema  Eyes:      Extraocular Movements: Extraocular movements intact  Conjunctiva/sclera: Conjunctivae normal       Pupils: Pupils are equal, round, and reactive to light  Cardiovascular:      Rate and Rhythm: Normal rate and regular rhythm  Pulses: Normal pulses  Heart sounds: Normal heart sounds  No murmur heard  Pulmonary:      Effort: Pulmonary effort is normal  No respiratory distress  Breath sounds: Normal breath sounds  No wheezing  Abdominal:      General: Abdomen is flat  Bowel sounds are normal  There is no distension  Palpations: Abdomen is soft  There is no mass  Tenderness: There is no abdominal tenderness  There is no right CVA tenderness, left CVA tenderness or guarding  Hernia: No hernia is present  Musculoskeletal:         General: No swelling, tenderness or deformity  Normal range of motion  Cervical back: Normal range of motion and neck supple  No rigidity or tenderness  Right lower leg: No edema  Left lower leg: No edema  Skin:     General: Skin is warm and dry  Capillary Refill: Capillary refill takes less than 2 seconds  Coloration: Skin is not jaundiced  Findings: No erythema or rash  Neurological:      General: No focal deficit present  Mental Status: She is alert and oriented to person, place, and time  Cranial Nerves: No cranial nerve deficit  Motor: No weakness  Gait: Gait normal          Vital Signs  ED Triage Vitals   Temperature Pulse Respirations Blood Pressure SpO2   11/03/22 2039 11/03/22 1651 11/03/22 1651 11/03/22 1651 11/03/22 1651   98 6 °F (37 °C) 90 18 154/78 99 %      Temp Source Heart Rate Source Patient Position - Orthostatic VS BP Location FiO2 (%)   11/03/22 2039 11/03/22 1651 11/03/22 1651 11/03/22 1651 --   Oral Monitor Sitting Left arm       Pain Score       11/03/22 1652       7           Vitals:    11/03/22 1651   BP: 154/78   Pulse: 90   Patient Position - Orthostatic VS: Sitting         Visual Acuity  Visual Acuity    Flowsheet Row Most Recent Value   L Pupil Size (mm) 3   R Pupil Size (mm) 3          ED Medications  Medications   albuterol (PROVENTIL HFA,VENTOLIN HFA) inhaler 2 puff (2 puffs Inhalation Given 11/3/22 2045)       Diagnostic Studies  Results Reviewed     Procedure Component Value Units Date/Time    FLU/RSV/COVID - if FLU/RSV clinically relevant [[id number]]  (Normal) Collected: 11/03/22 1926    Lab Status: Final result Specimen: Nares from Nose Updated: 11/03/22 2019     SARS-CoV-2 Negative     INFLUENZA A PCR Negative     INFLUENZA B PCR Negative     RSV PCR Negative    Narrative:      FOR PEDIATRIC PATIENTS - copy/paste COVID Guidelines URL to browser: [email or web address]    SARS-CoV-2 assay is a Nucleic Acid Amplification assay intended for the  qualitative detection of nucleic acid from SARS-CoV-2 in nasopharyngeal  swabs  Results are for the presumptive identification of SARS-CoV-2 RNA  Positive results are indicative of infection with SARS-CoV-2, the virus  causing COVID-19, but do not rule out bacterial infection or co-infection  with other viruses  Laboratories within the [place] and its  territories are required to report all positive results to the appropriate  public health authorities  Negative results do not preclude SARS-CoV-2  infection and should not be used as the sole basis for treatment or other  patient management decisions  Negative results must be combined with  clinical observations, patient history, and epidemiological information  This test has not been FDA cleared or approved  This test has been authorized by FDA under an Emergency Use Authorization  (EUA)  This test is only authorized for the duration of time the  declaration that circumstances exist justifying the authorization of the  emergency use of an in vitro diagnostic tests for detection of SARS-CoV-2  virus and/or diagnosis of COVID-19 infection under section 564(b)(1) of  the Act, 21 U  S C  167GDM-5(J)(8), unless the authorization is terminated  or revoked sooner  The test has been validated but independent review by FDA  and CLIA is pending  Test performed using [employer] GeneXpert: This RT-PCR assay targets N2,  a region unique to SARS-CoV-2  A conserved region in the E-gene was chosen  for pan-Sarbecovirus detection which includes SARS-CoV-2  According to CMS-2020-01-R, this platform meets the definition of high-throughput technology      HS Troponin 0hr (reflex protocol) [[id number]]  (Normal) Collected: 11/03/22 1921    Lab Status: Final result Specimen: Blood from Arm, Left Updated: 11/03/22 1959     hs TnI 0hr <2 ng/L     Comprehensive metabolic panel [[id number]]  (Abnormal) Collected: 11/03/22 1921    Lab Status: Final result Specimen: Blood from Arm, Left Updated: 11/03/22 1951     Sodium 142 mmol/L      Potassium 3 6 mmol/L      Chloride 104 mmol/L      CO2 29 mmol/L      ANION GAP 9 mmol/L      BUN 17 mg/dL      Creatinine 0 55 mg/dL      Glucose 109 mg/dL      Calcium 8 7 mg/dL      Corrected Calcium 9 2 mg/dL      AST 17 U/L      ALT 21 U/L      Alkaline Phosphatase 99 U/L      Total Protein 7 5 g/dL      Albumin 3 4 g/dL      Total Bilirubin 0 21 mg/dL      eGFR 107 ml/min/1 73sq m     Narrative:      National Kidney Disease Foundation guidelines for Chronic Kidney Disease (CKD):   •  Stage 1 with normal or high GFR (GFR > 90 mL/min/1 73 square meters)  •  Stage 2 Mild CKD (GFR = 60-89 mL/min/1 73 square meters)  •  Stage 3A Moderate CKD (GFR = 45-59 mL/min/1 73 square meters)  •  Stage 3B Moderate CKD (GFR = 30-44 mL/min/1 73 square meters)  •  Stage 4 Severe CKD (GFR = 15-29 mL/min/1 73 square meters)  •  Stage 5 End Stage CKD (GFR <15 mL/min/1 73 square meters)  Note: GFR calculation is accurate only with a steady state creatinine    CBC and differential [[id number]]  (Abnormal) Collected: 11/03/22 1921    Lab Status: Final result Specimen: Blood from Arm, Left Updated: 11/03/22 1931     WBC 9 03 Thousand/uL      RBC 4 09 Million/uL      Hemoglobin 11 4 g/dL      Hematocrit 34 8 %      MCV 85 fL      MCH 27 9 pg      MCHC 32 8 g/dL      RDW 14 5 %      MPV 11 1 fL      Platelets 300 Thousands/uL      nRBC 0 /100 WBCs      Neutrophils Relative 60 %      Immat GRANS % 0 %      Lymphocytes Relative 28 %      Monocytes Relative 9 %      Eosinophils Relative 2 %      Basophils Relative 1 %      Neutrophils Absolute 5 46 Thousands/µL      Immature Grans Absolute 0 03 Thousand/uL      Lymphocytes Absolute 2 48 Thousands/µL      Monocytes Absolute 0 83 Thousand/µL      Eosinophils Absolute 0 18 Thousand/µL      Basophils Absolute 0 05 Thousands/µL                  XR chest 2 views    (Results Pending)              Procedures  Procedures         ED Course SBIRT 20yo+    Flowsheet Row Most Recent Value   SBIRT (25 yo +)    In order to provide better care to our patients, we are screening all of our patients for alcohol and drug use  Would it be okay to ask you these screening questions? Yes Filed at: 11/03/2022 1950   Initial Alcohol Screen: US AUDIT-C     1  How often do you have a drink containing alcohol? 0 Filed at: 11/03/2022 1950   2  How many drinks containing alcohol do you have on a typical day you are drinking? 0 Filed at: 11/03/2022 1950   3b  FEMALE Any Age, or MALE 65+: How often do you have 4 or more drinks on one occassion? 0 Filed at: 11/03/2022 1950   Audit-C Score 0 Filed at: 11/03/2022 1950   LAUREN: How many times in the past year have you    Used an illegal drug or used a prescription medication for non-medical reasons? Never Filed at: 11/03/2022 1950                    [facility]  Number of Diagnoses or Management Options  Viral illness: minor     Amount and/or Complexity of Data Reviewed  Clinical lab tests: reviewed  Tests in the radiology section of CPT®: reviewed  Review and summarize past medical records: yes    Risk of Complications, Morbidity, and/or Mortality  Presenting problems: moderate  Diagnostic procedures: moderate  Management options: moderate    Patient Progress  Patient progress: stable      Disposition  Final diagnoses:   Viral illness     Time reflects when diagnosis was documented in both MDM as applicable and the Disposition within this note     Time User Action Codes Description Comment    11/3/2022  8:39 PM Carolyn Lyles Add [B34 9] Viral illness       ED Disposition     ED Disposition   Discharge    Condition   Stable    Date/Time   Thu Nov 3, 2022 2039    [address] discharge to home/self care                 Follow-up Information     Follow up With Specialties Details Why Contact Info Additional [address] [facility] Emergency Medicine Go to If symptoms worsen [address] [facility]  [address] [facility], [address] [facility], [place], [place], One Amy Soto MD Family Medicine Call today To schedule an appointment for follow-up [address] [place] [address]  [phone]             Discharge Medication List as of 11/3/2022  8:40 PM      CONTINUE these medications which have NOT CHANGED    Details   !! albuterol (ProAir HFA) 90 mcg/act inhaler Inhale 2 puffs every 6 (six) hours as needed for wheezing, Starting Wed 12/29/2021, Normal      !! albuterol (PROVENTIL HFA,VENTOLIN HFA) 90 mcg/act inhaler Inhale 2 puffs every 4 (four) hours as needed for wheezing, Starting Mon 3/25/2019, Print      dicyclomine (BENTYL) 20 mg tablet Take 1 tablet every 6 hours for abdominal cramping, Print      !! HYDROcodone-acetaminophen (NORCO) 5-325 mg per tablet Take 1 tablet by mouth every 6 (six) hours as needed for pain for up to 6 dosesMax Daily Amount: 4 tablets, Starting Sun 12/6/2020, Print      !! HYDROcodone-acetaminophen (NORCO) 5-325 mg per tablet Take 1 tablet by mouth every 6 (six) hours as needed for pain (dental pain/initial rx  )Max Daily Amount: 4 tablets, Starting Sun 8/8/2021, Normal      ibuprofen (MOTRIN) 600 mg tablet Take 1 tablet by mouth every 6 (six) hours as needed for mild pain, Starting Sat 11/11/2017, Print      lidocaine (LIDODERM) 5 % Apply 1 patch topically daily Remove & Discard patch within 12 hours or as directed by MD, Starting Mon 11/23/2020, Print      !! Lidocaine Viscous HCl (XYLOCAINE) 2 % mucosal solution Swish and spit 15 mL 4 (four) times a day as needed for mouth pain or discomfort, Starting Sun 12/6/2020, Print      !! Lidocaine Viscous HCl (XYLOCAINE) 2 % mucosal solution Swish and spit 15 mL 4 (four) times a day as needed for mouth pain or discomfort, Starting Sun 12/6/2020, Print      !!  Lidocaine Viscous HCl (XYLOCAINE) 2 % mucosal solution Swish and spit 15 mL 4 (four) times a day as needed for mouth pain or discomfort, Starting Sun 12/6/2020, Print      methocarbamol (ROBAXIN) 750 mg tablet Take 1 tablet (750 mg total) by mouth every 6 (six) hours as needed for muscle spasms, Starting Mon 11/23/2020, Print      metoclopramide (REGLAN) 10 mg tablet Take 1 tablet (10 mg total) by mouth every 6 (six) hours as needed (n/v or headache), Starting Wed 12/29/2021, Normal      naproxen (NAPROSYN) 500 mg tablet Take 1 tablet (500 mg total) by mouth 2 (two) times a day with meals, Starting Mon 11/23/2020, Print      predniSONE 5 mg tablet 40 mg PO day 1, then 30 mg PO day 2, 20 mg PO day 3, 10 mg PO day 4, 5 mg PO day 5 then stop, Print      valACYclovir (VALTREX) 500 mg tablet Take 1 tablet by mouth 2 (two) times a day for 3 days, Starting Sat 1/6/2018, Until Wed 5/27/2020, Print       !! - Potential duplicate medications found  Please discuss with provider  No discharge procedures on file      PDMP Review     None          ED Provider  Electronically Signed by           Denice Lenz PA-C  11/04/22 9133

## 2023-10-23 ENCOUNTER — HOSPITAL ENCOUNTER (EMERGENCY)
Facility: HOSPITAL | Age: 53
Discharge: HOME | End: 2023-10-23
Attending: EMERGENCY MEDICINE
Payer: COMMERCIAL

## 2023-10-23 ENCOUNTER — APPOINTMENT (EMERGENCY)
Dept: VASCULAR ULTRASOUND | Facility: HOSPITAL | Age: 53
End: 2023-10-23
Payer: COMMERCIAL

## 2023-10-23 ENCOUNTER — APPOINTMENT (EMERGENCY)
Dept: RADIOLOGY | Facility: HOSPITAL | Age: 53
End: 2023-10-23
Payer: COMMERCIAL

## 2023-10-23 VITALS
RESPIRATION RATE: 16 BRPM | WEIGHT: 170 LBS | HEART RATE: 80 BPM | OXYGEN SATURATION: 98 % | SYSTOLIC BLOOD PRESSURE: 165 MMHG | DIASTOLIC BLOOD PRESSURE: 77 MMHG | BODY MASS INDEX: 29.18 KG/M2 | TEMPERATURE: 97.4 F

## 2023-10-23 DIAGNOSIS — M22.2X2 PATELLOFEMORAL PAIN SYNDROME OF LEFT KNEE: ICD-10-CM

## 2023-10-23 DIAGNOSIS — M25.572 LEFT ANKLE PAIN: Primary | ICD-10-CM

## 2023-10-23 PROCEDURE — 93971 EXTREMITY STUDY: CPT

## 2023-10-23 PROCEDURE — 73502 X-RAY EXAM HIP UNI 2-3 VIEWS: CPT

## 2023-10-23 PROCEDURE — 72100 X-RAY EXAM L-S SPINE 2/3 VWS: CPT

## 2023-10-23 PROCEDURE — 93971 EXTREMITY STUDY: CPT | Performed by: SURGERY

## 2023-10-23 PROCEDURE — 99285 EMERGENCY DEPT VISIT HI MDM: CPT | Performed by: PHYSICIAN ASSISTANT

## 2023-10-23 PROCEDURE — 73610 X-RAY EXAM OF ANKLE: CPT

## 2023-10-23 PROCEDURE — 99283 EMERGENCY DEPT VISIT LOW MDM: CPT

## 2023-10-23 PROCEDURE — 96372 THER/PROPH/DIAG INJ SC/IM: CPT

## 2023-10-23 RX ORDER — KETOROLAC TROMETHAMINE 30 MG/ML
30 INJECTION, SOLUTION INTRAMUSCULAR; INTRAVENOUS ONCE
Status: COMPLETED | OUTPATIENT
Start: 2023-10-23 | End: 2023-10-23

## 2023-10-23 RX ORDER — IBUPROFEN 400 MG/1
400 TABLET ORAL EVERY 6 HOURS PRN
Qty: 30 TABLET | Refills: 0 | Status: SHIPPED | OUTPATIENT
Start: 2023-10-23 | End: 2023-10-28

## 2023-10-23 RX ORDER — TRAMADOL HYDROCHLORIDE 50 MG/1
50 TABLET ORAL EVERY 6 HOURS PRN
Qty: 20 TABLET | Refills: 0 | Status: SHIPPED | OUTPATIENT
Start: 2023-10-23 | End: 2023-11-02

## 2023-10-23 RX ADMIN — KETOROLAC TROMETHAMINE 30 MG: 30 INJECTION, SOLUTION INTRAMUSCULAR; INTRAVENOUS at 12:25

## 2023-10-23 NOTE — ED PROVIDER NOTES
History  Chief Complaint   Patient presents with    Leg Pain     Pt presents with c/o L sided hip pain radiating down the L leg with swelling to the L ankle. Denies any injury to the leg. Pain began prior to arrival today. 46 y.o. female presents to ED with chief complaint of left ankle pain and swelling that radiates up left leg to left hip  Onset of symptoms reported as sudden, this morning. Location of symptoms reported as initially left ankle this morning with progression to radiation up left leg to left hip  Quality is reported as initially localized swelling and sharp pain to left lateral ankle, now reports sharp shooting pain up leg into left hip  Severity is reported as moderate  Associated symptoms: denies fever, denies paralysis, paraesthesia or weakness to LLE, denies left knee pain. Denies joint redness or warmth. Modifying factors: weight bearing exacerbates pain. Non weight bearing improves pain    Context: denies acute fall injury or trauma. Denies prior similar episodes in the past.  States she initially started with sharp pain to left lateral ankle with localized swelling this morning. Reports pain worse with weight bearing. States that pain now radiates up left leg into left hip. History provided by:  Patient   used: No    Leg Pain  Associated symptoms: no back pain, no fatigue, no fever and no neck pain        Prior to Admission Medications   Prescriptions Last Dose Informant Patient Reported? Taking? HYDROcodone-acetaminophen (NORCO) 5-325 mg per tablet   No No   Sig: Take 1 tablet by mouth every 6 (six) hours as needed for pain for up to 6 dosesMax Daily Amount: 4 tablets   HYDROcodone-acetaminophen (NORCO) 5-325 mg per tablet   No No   Sig: Take 1 tablet by mouth every 6 (six) hours as needed for pain (dental pain/initial rx. )Max Daily Amount: 4 tablets   Lidocaine Viscous HCl (XYLOCAINE) 2 % mucosal solution   No No   Sig: Swish and spit 15 mL 4 (four) times a day as needed for mouth pain or discomfort   Lidocaine Viscous HCl (XYLOCAINE) 2 % mucosal solution   No No   Sig: Swish and spit 15 mL 4 (four) times a day as needed for mouth pain or discomfort   Lidocaine Viscous HCl (XYLOCAINE) 2 % mucosal solution   No No   Sig: Swish and spit 15 mL 4 (four) times a day as needed for mouth pain or discomfort   albuterol (PROVENTIL HFA,VENTOLIN HFA) 90 mcg/act inhaler   No No   Sig: Inhale 2 puffs every 4 (four) hours as needed for wheezing   albuterol (ProAir HFA) 90 mcg/act inhaler   No No   Sig: Inhale 2 puffs every 6 (six) hours as needed for wheezing   dicyclomine (BENTYL) 20 mg tablet   No No   Sig: Take 1 tablet every 6 hours for abdominal cramping   Patient not taking: Reported on 2020   ibuprofen (MOTRIN) 600 mg tablet   No No   Sig: Take 1 tablet by mouth every 6 (six) hours as needed for mild pain   Patient not taking: Reported on 2020   lidocaine (LIDODERM) 5 %   No No   Sig: Apply 1 patch topically daily Remove & Discard patch within 12 hours or as directed by MD   methocarbamol (ROBAXIN) 750 mg tablet   No No   Sig: Take 1 tablet (750 mg total) by mouth every 6 (six) hours as needed for muscle spasms   metoclopramide (REGLAN) 10 mg tablet   No No   Sig: Take 1 tablet (10 mg total) by mouth every 6 (six) hours as needed (n/v or headache)   naproxen (NAPROSYN) 500 mg tablet   No No   Sig: Take 1 tablet (500 mg total) by mouth 2 (two) times a day with meals   predniSONE 5 mg tablet   No No   Si mg PO day 1, then 30 mg PO day 2, 20 mg PO day 3, 10 mg PO day 4, 5 mg PO day 5 then stop   valACYclovir (VALTREX) 500 mg tablet   No No   Sig: Take 1 tablet by mouth 2 (two) times a day for 3 days      Facility-Administered Medications: None       History reviewed. No pertinent past medical history.     Past Surgical History:   Procedure Laterality Date    HYSTERECTOMY      PILONIDAL CYST EXCISION N/A 2020    Procedure: EXCISION PILONIDAL CYST;  Surgeon: Bebe Bradford MD;  Location: [facility];  Service: General    TUBAL LIGATION      TUMOR REMOVAL      scalp        History reviewed. No pertinent family history. I have reviewed and agree with the history as documented. E-Cigarette/Vaping    E-Cigarette Use Never User      E-Cigarette/Vaping Substances    Nicotine No     THC No     CBD No     Flavoring No     Other No     Unknown No      Social History     Tobacco Use    Smoking status: Every Day     Packs/day: 0.20     Types: Cigarettes    Smokeless tobacco: Never   Vaping Use    Vaping Use: Never used   Substance Use Topics    Alcohol use: Yes     Comment: occ    Drug use: No       Review of Systems   Constitutional:  Negative for activity change, appetite change, chills, diaphoresis, fatigue, fever and unexpected weight change. HENT:  Negative for congestion, dental problem, drooling, ear discharge, ear pain, facial swelling, hearing loss, mouth sores, nosebleeds, postnasal drip, rhinorrhea, sinus pressure, sinus pain, sneezing, sore throat, tinnitus, trouble swallowing and voice change. Eyes:  Negative for photophobia, pain, discharge, redness, itching and visual disturbance. Respiratory:  Negative for apnea, cough, choking, chest tightness, shortness of breath, wheezing and stridor. Cardiovascular:  Negative for chest pain, palpitations and leg swelling. Gastrointestinal:  Negative for abdominal distention, abdominal pain, anal bleeding, blood in stool, constipation, diarrhea, nausea, rectal pain and vomiting. Endocrine: Negative for cold intolerance, heat intolerance, polydipsia, polyphagia and polyuria. Genitourinary:  Negative for decreased urine volume, difficulty urinating, dyspareunia, dysuria, enuresis, flank pain, frequency, hematuria, menstrual problem, pelvic pain, urgency, vaginal bleeding, vaginal discharge and vaginal pain. Musculoskeletal:  Positive for arthralgias and joint swelling.  Negative for back pain, myalgias, neck pain and neck stiffness. Skin:  Negative for color change, pallor, rash and wound. Allergic/Immunologic: Negative for environmental allergies, food allergies and immunocompromised state. Neurological:  Negative for dizziness, tremors, seizures, syncope, facial asymmetry, speech difficulty, weakness, light-headedness, numbness and headaches. Hematological:  Negative for adenopathy. Does not bruise/bleed easily. Psychiatric/Behavioral:  Negative for agitation, confusion, hallucinations, self-injury and suicidal ideas. The patient is not hyperactive. All other systems reviewed and are negative. Physical Exam  Physical Exam  Vitals and nursing note reviewed. Constitutional:       General: She is not in acute distress. Appearance: Normal appearance. HENT:      Head: Normocephalic and atraumatic. Right Ear: External ear normal.      Left Ear: External ear normal.      Nose: Nose normal.   Eyes:      General: No scleral icterus. Right eye: No discharge. Left eye: No discharge. Cardiovascular:      Rate and Rhythm: Normal rate. Pulses: Normal pulses. Pulmonary:      Effort: Pulmonary effort is normal.      Breath sounds: Normal breath sounds. Musculoskeletal:         General: Tenderness present. No deformity or signs of injury. Normal range of motion. Cervical back: Normal range of motion and neck supple. Left hip: Tenderness present. No deformity, lacerations, bony tenderness or crepitus. Normal range of motion. Normal strength. Left upper leg: Tenderness present. No swelling, edema, deformity, lacerations or bony tenderness. Left knee: Normal.      Left ankle: Swelling present. No deformity, ecchymosis or lacerations. No tenderness. Normal range of motion. Anterior drawer test negative. Normal pulse. Left foot: Normal.        Legs:       Comments: Left ankle:  normal inspection, full passive and active ROM.    Mild soft tissue swelling without overlying erythema or warmth to left lateral ankle. Anterior, posterior and medial ankle surfaces nontender to palpation. Left foot normal to inspection, non tender to palpation to all surfaces with FROM. No 5th metatarsal or calcaneus tenderness to palpation. Achiles tendon is non tender to palpation. Distal neurovascular tendon intact to all toes of left foot. DP and PT pulses 2+ normal.  Left knee is normal to inspection, nontender to palpation with FROM . Left thigh normal inspection, patient reports tenderness to palpation to lateral surface of left thigh. Left hip normal to inspection, nontender to palpation to all surfaces. Skin:     General: Skin is dry. Capillary Refill: Capillary refill takes less than 2 seconds. Coloration: Skin is not jaundiced. Findings: No erythema or rash. Neurological:      General: No focal deficit present. Mental Status: She is alert and oriented to person, place, and time. Mental status is at baseline. Sensory: No sensory deficit. Motor: No weakness. Psychiatric:         Mood and Affect: Mood normal.         Behavior: Behavior normal.         Thought Content:  Thought content normal.         Vital Signs  ED Triage Vitals [10/23/23 1026]   Temperature Pulse Respirations Blood Pressure SpO2   (!) 97.4 °F (36.3 °C) 80 16 165/77 98 %      Temp Source Heart Rate Source Patient Position - Orthostatic VS BP Location FiO2 (%)   Temporal Monitor Sitting Left arm --      Pain Score       8           Vitals:    10/23/23 1026   BP: 165/77   Pulse: 80   Patient Position - Orthostatic VS: Sitting         Visual Acuity      ED Medications  Medications   ketorolac (TORADOL) injection 30 mg (30 mg Intramuscular Given 10/23/23 1225)       Diagnostic Studies  Results Reviewed       None                   VAS lower limb venous duplex study, unilateral/limited   Final Result by Alli Conteh MD (10/23 5106)      XR lumbar spine 2 or 3 views    (Results Pending)   XR hip/pelv 2-3 vws left    (Results Pending)   XR ankle 3+ views LEFT    (Results Pending)              Procedures  Procedures         ED Course  ED Course as of 10/23/23 1546   Mon Oct 23, 2023   1203 VAS negative for DVT                               SBIRT 20yo+      Flowsheet Row Most Recent Value   Initial Alcohol Screen: US AUDIT-C     1. How often do you have a drink containing alcohol? 0 Filed at: 10/23/2023 1208   2. How many drinks containing alcohol do you have on a typical day you are drinking? 0 Filed at: 10/23/2023 1208   3a. Male UNDER 65: How often do you have five or more drinks on one occasion? 0 Filed at: 10/23/2023 1208   3b. FEMALE Any Age, or MALE 65+: How often do you have 4 or more drinks on one occassion? 0 Filed at: 10/23/2023 1208   Audit-C Score 0 Filed at: 10/23/2023 1208   LAUREN: How many times in the past year have you. .. Used an illegal drug or used a prescription medication for non-medical reasons? Never Filed at: 10/23/2023 1208                      Medical Decision Making  MDM    DDX:  suspect patellofemoral pain syndrome vs lumbar radiculopathy, ddx includes but is not limited to DVT, fracture, contusion, sprain, strain, nerve injury, tendon injury, vascular injury, tendinitis, bursitis, dislocation. Doubt joint infection or gout. Initial ED Plan:  VAS LLE to rule out DVT. Xray left ankle, hip and lumbar spine to evaluate sources of pain. MDM:  I have reviewed the patient's vital signs, nursing notes, and other relevant ancillary testing/information. I have had a detailed discussion with the patient regarding the historical points, examination findings, and any diagnostic results    Results:  Reviewed at bedside. Xray left hip, left ankle and lumbar spine:  my initial interpretation. No acute left hip fracture or dislocation. No acute lumbar vertebral spine compression fracture or severe subluxation.   No acute left ankle fracture or dislocation. VAS negative for DVT    ED Summary: results reviewed with patient at bedside. Discussed diagnosis of left ankle pain, and pain radiating down left leg in absence of fall/trauma/injury or identified causative activity. May represent patellofemoral pain syndrome. Clinical exam inconsistent with joint infection, or fracture. No DVT on vascular evaluation. Will treat with tramadol and NSAIDS for pain. Ice/Rest/avoidance of aggravating activities. F/u pcp and ortho/sports medicine in 3-5 days instructed. Reviewed reasons to return to ed            Amount and/or Complexity of Data Reviewed  Radiology: ordered. Risk  Prescription drug management. Disposition  Final diagnoses:   Left ankle pain   Patellofemoral pain syndrome of left knee     Time reflects when diagnosis was documented in both MDM as applicable and the Disposition within this note       Time User Action Codes Description Comment    10/23/2023 12:06 PM Sunny Long Add [M25.572] Left ankle pain     10/23/2023 12:06 PM Sunny Long Add [F70.1A1] Patellofemoral pain syndrome of left knee           ED Disposition       ED Disposition   Discharge    Condition   Stable    Date/Time   Mon Oct 23, 2023 1206    [address] [place] [address] discharge to home/self care.                    Follow-up Information       Follow up With Specialties Details Why Contact Info Additional Information    Katharine Houston MD Family Medicine Call in 3 days for further evaluation of symptoms [id number] [place] [address] 6 Emergency Department Emergency Medicine Go to  If symptoms worsen [address] [facility] Emergency Department, [place], [place], [address] [place] [address] [place], MD Orthopedic Surgery Call in 1 week for further evaluation of symptoms [address] [place] [id number] [facility]               Discharge Medication List as of 10/23/2023 12:08 PM        START taking these medications    Details   !! ibuprofen (MOTRIN) 400 mg tablet Take 1 tablet (400 mg total) by mouth every 6 (six) hours as needed for moderate pain (left ankle/leg pain) for up to 5 days, Starting Mon 10/23/2023, Until Sat 10/28/2023 at 2359, Normal      traMADol (Ultram) 50 mg tablet Take 1 tablet (50 mg total) by mouth every 6 (six) hours as needed for moderate pain (left leg pain) for up to 10 days, Starting Mon 10/23/2023, Until Thu 11/2/2023 at 2359, Normal       !! - Potential duplicate medications found. Please discuss with provider. CONTINUE these medications which have NOT CHANGED    Details   !! albuterol (ProAir HFA) 90 mcg/act inhaler Inhale 2 puffs every 6 (six) hours as needed for wheezing, Starting Wed 12/29/2021, Normal      !! albuterol (PROVENTIL HFA,VENTOLIN HFA) 90 mcg/act inhaler Inhale 2 puffs every 4 (four) hours as needed for wheezing, Starting Mon 3/25/2019, Print      dicyclomine (BENTYL) 20 mg tablet Take 1 tablet every 6 hours for abdominal cramping, Print      !! HYDROcodone-acetaminophen (NORCO) 5-325 mg per tablet Take 1 tablet by mouth every 6 (six) hours as needed for pain for up to 6 dosesMax Daily Amount: 4 tablets, Starting Sun 12/6/2020, Print      !! HYDROcodone-acetaminophen (NORCO) 5-325 mg per tablet Take 1 tablet by mouth every 6 (six) hours as needed for pain (dental pain/initial rx. )Max Daily Amount: 4 tablets, Starting Sun 8/8/2021, Normal      !! ibuprofen (MOTRIN) 600 mg tablet Take 1 tablet by mouth every 6 (six) hours as needed for mild pain, Starting Sat 11/11/2017, Print      lidocaine (LIDODERM) 5 % Apply 1 patch topically daily Remove & Discard patch within 12 hours or as directed by MD, Starting Mon 11/23/2020, Print      !!  Lidocaine Viscous HCl (XYLOCAINE) 2 % mucosal solution Swish and spit 15 mL 4 (four) times a day as needed for mouth pain or discomfort, Starting Sun 12/6/2020, Print      !! Lidocaine Viscous HCl (XYLOCAINE) 2 % mucosal solution Swish and spit 15 mL 4 (four) times a day as needed for mouth pain or discomfort, Starting Sun 12/6/2020, Print      !! Lidocaine Viscous HCl (XYLOCAINE) 2 % mucosal solution Swish and spit 15 mL 4 (four) times a day as needed for mouth pain or discomfort, Starting Sun 12/6/2020, Print      methocarbamol (ROBAXIN) 750 mg tablet Take 1 tablet (750 mg total) by mouth every 6 (six) hours as needed for muscle spasms, Starting Mon 11/23/2020, Print      metoclopramide (REGLAN) 10 mg tablet Take 1 tablet (10 mg total) by mouth every 6 (six) hours as needed (n/v or headache), Starting Wed 12/29/2021, Normal      naproxen (NAPROSYN) 500 mg tablet Take 1 tablet (500 mg total) by mouth 2 (two) times a day with meals, Starting Mon 11/23/2020, Print      predniSONE 5 mg tablet 40 mg PO day 1, then 30 mg PO day 2, 20 mg PO day 3, 10 mg PO day 4, 5 mg PO day 5 then stop, Print      valACYclovir (VALTREX) 500 mg tablet Take 1 tablet by mouth 2 (two) times a day for 3 days, Starting Sat 1/6/2018, Until Wed 5/27/2020, Print       !! - Potential duplicate medications found. Please discuss with provider. No discharge procedures on file.     PDMP Review       None            ED Provider  Electronically Signed by             Jarrett Olivarez PA-C  10/23/23 3618

## 2023-12-30 ENCOUNTER — HOSPITAL ENCOUNTER (EMERGENCY)
Facility: HOSPITAL | Age: 53
Discharge: HOME | End: 2023-12-30
Attending: EMERGENCY MEDICINE | Admitting: EMERGENCY MEDICINE
Payer: COMMERCIAL

## 2023-12-30 ENCOUNTER — APPOINTMENT (OUTPATIENT)
Dept: RADIOLOGY | Facility: HOSPITAL | Age: 53
End: 2023-12-30
Payer: COMMERCIAL

## 2023-12-30 VITALS
SYSTOLIC BLOOD PRESSURE: 155 MMHG | DIASTOLIC BLOOD PRESSURE: 87 MMHG | TEMPERATURE: 97.8 F | HEIGHT: 64 IN | RESPIRATION RATE: 18 BRPM | OXYGEN SATURATION: 99 % | BODY MASS INDEX: 33.29 KG/M2 | HEART RATE: 88 BPM | WEIGHT: 195 LBS

## 2023-12-30 DIAGNOSIS — S82.899A ANKLE FRACTURE: Primary | ICD-10-CM

## 2023-12-30 DIAGNOSIS — S82.892A CLOSED FRACTURE OF LEFT ANKLE, INITIAL ENCOUNTER: ICD-10-CM

## 2023-12-30 PROCEDURE — 99283 EMERGENCY DEPT VISIT LOW MDM: CPT

## 2023-12-30 PROCEDURE — 73610 X-RAY EXAM OF ANKLE: CPT

## 2023-12-30 PROCEDURE — 99284 EMERGENCY DEPT VISIT MOD MDM: CPT | Performed by: EMERGENCY MEDICINE

## 2023-12-30 PROCEDURE — 96372 THER/PROPH/DIAG INJ SC/IM: CPT

## 2023-12-30 RX ORDER — MORPHINE SULFATE 4 MG/ML
4 INJECTION, SOLUTION INTRAMUSCULAR; INTRAVENOUS ONCE
Status: COMPLETED | OUTPATIENT
Start: 2023-12-30 | End: 2023-12-30

## 2023-12-30 RX ADMIN — MORPHINE SULFATE 4 MG: 4 INJECTION INTRAVENOUS at 18:50

## 2023-12-30 NOTE — ED PROVIDER NOTES
History  Chief Complaint   Patient presents with    Ankle Injury     Pt c/o left ankle pain after falling while roller skating      Sadie Bazzi is a 53 y.o.  year old female  History reviewed. No pertinent past medical history.  Social History    Tobacco Use      Smoking status: Every Day        Packs/day: 0.20        Types: Cigarettes      Smokeless tobacco: Never    Vaping Use      Vaping status: Never Used    Alcohol use: Yes      Comment: occ    Drug use: No    Patient presents with:  Ankle Injury: Pt c/o left ankle pain after falling while roller skating   Patient was rollerskating and hurt her left ankle.  She has moderate to severe constant pain.  Unable to put weight on it.  No other injuries.  She was brought into the emergency department via EMS  History obtained directly from the PATIENT              History provided by:  Patient   used: No    Ankle Injury  Severity:  Severe  Onset quality:  Sudden  Associated symptoms: no abdominal pain, no chest pain, no cough, no ear pain, no fever, no rash, no shortness of breath, no sore throat and no vomiting        Prior to Admission Medications   Prescriptions Last Dose Informant Patient Reported? Taking?   HYDROcodone-acetaminophen (NORCO) 5-325 mg per tablet   No No   Sig: Take 1 tablet by mouth every 6 (six) hours as needed for pain for up to 6 dosesMax Daily Amount: 4 tablets   HYDROcodone-acetaminophen (NORCO) 5-325 mg per tablet   No No   Sig: Take 1 tablet by mouth every 6 (six) hours as needed for pain (dental pain/initial rx.)Max Daily Amount: 4 tablets   Lidocaine Viscous HCl (XYLOCAINE) 2 % mucosal solution   No No   Sig: Swish and spit 15 mL 4 (four) times a day as needed for mouth pain or discomfort   Lidocaine Viscous HCl (XYLOCAINE) 2 % mucosal solution   No No   Sig: Swish and spit 15 mL 4 (four) times a day as needed for mouth pain or discomfort   Lidocaine Viscous HCl (XYLOCAINE) 2 % mucosal solution   No No   Sig: Swish and  spit 15 mL 4 (four) times a day as needed for mouth pain or discomfort   albuterol (PROVENTIL HFA,VENTOLIN HFA) 90 mcg/act inhaler   No No   Sig: Inhale 2 puffs every 4 (four) hours as needed for wheezing   albuterol (ProAir HFA) 90 mcg/act inhaler   No No   Sig: Inhale 2 puffs every 6 (six) hours as needed for wheezing   dicyclomine (BENTYL) 20 mg tablet   No No   Sig: Take 1 tablet every 6 hours for abdominal cramping   Patient not taking: Reported on 2020   ibuprofen (MOTRIN) 400 mg tablet   No No   Sig: Take 1 tablet (400 mg total) by mouth every 6 (six) hours as needed for moderate pain (left ankle/leg pain) for up to 5 days   ibuprofen (MOTRIN) 600 mg tablet   No No   Sig: Take 1 tablet by mouth every 6 (six) hours as needed for mild pain   Patient not taking: Reported on 2020   lidocaine (LIDODERM) 5 %   No No   Sig: Apply 1 patch topically daily Remove & Discard patch within 12 hours or as directed by MD   methocarbamol (ROBAXIN) 750 mg tablet   No No   Sig: Take 1 tablet (750 mg total) by mouth every 6 (six) hours as needed for muscle spasms   metoclopramide (REGLAN) 10 mg tablet   No No   Sig: Take 1 tablet (10 mg total) by mouth every 6 (six) hours as needed (n/v or headache)   naproxen (NAPROSYN) 500 mg tablet   No No   Sig: Take 1 tablet (500 mg total) by mouth 2 (two) times a day with meals   predniSONE 5 mg tablet   No No   Si mg PO day 1, then 30 mg PO day 2, 20 mg PO day 3, 10 mg PO day 4, 5 mg PO day 5 then stop   valACYclovir (VALTREX) 500 mg tablet   No No   Sig: Take 1 tablet by mouth 2 (two) times a day for 3 days      Facility-Administered Medications: None       History reviewed. No pertinent past medical history.    Past Surgical History:   Procedure Laterality Date    HYSTERECTOMY      PILONIDAL CYST EXCISION N/A 2020    Procedure: EXCISION PILONIDAL CYST;  Surgeon: Ricardo Sinha MD;  Location: [facility];  Service: General    TUBAL LIGATION      TUMOR REMOVAL       scalp        History reviewed. No pertinent family history.  I have reviewed and agree with the history as documented.    E-Cigarette/Vaping    E-Cigarette Use Never User      E-Cigarette/Vaping Substances    Nicotine No     THC No     CBD No     Flavoring No     Other No     Unknown No      Social History     Tobacco Use    Smoking status: Every Day     Current packs/day: 0.20     Types: Cigarettes    Smokeless tobacco: Never   Vaping Use    Vaping status: Never Used   Substance Use Topics    Alcohol use: Yes     Comment: occ    Drug use: No       Review of Systems   Constitutional:  Negative for chills and fever.   HENT:  Negative for ear pain and sore throat.    Eyes:  Negative for pain and visual disturbance.   Respiratory:  Negative for cough and shortness of breath.    Cardiovascular:  Negative for chest pain and palpitations.   Gastrointestinal:  Negative for abdominal pain and vomiting.   Genitourinary:  Negative for dysuria and hematuria.   Musculoskeletal:  Positive for arthralgias and gait problem. Negative for back pain.   Skin:  Negative for color change and rash.   Neurological:  Negative for seizures and syncope.   All other systems reviewed and are negative.      Physical Exam  Physical Exam  Vitals reviewed.   Constitutional:       Appearance: Normal appearance.   HENT:      Head: Normocephalic.      Right Ear: External ear normal.      Left Ear: External ear normal.      Mouth/Throat:      Mouth: Mucous membranes are moist.   Eyes:      Pupils: Pupils are equal, round, and reactive to light.   Cardiovascular:      Rate and Rhythm: Normal rate.   Pulmonary:      Effort: Pulmonary effort is normal.   Musculoskeletal:      Left ankle: Swelling present. No deformity, ecchymosis or lacerations. Tenderness present. Decreased range of motion. Anterior drawer test negative. Normal pulse.   Skin:     Capillary Refill: Capillary refill takes less than 2 seconds.   Neurological:      General: No focal deficit  present.      Mental Status: She is alert and oriented to person, place, and time.   Psychiatric:         Mood and Affect: Mood normal.         Thought Content: Thought content normal.         Vital Signs  ED Triage Vitals [12/30/23 1801]   Temperature Pulse Respirations Blood Pressure SpO2   97.8 °F (36.6 °C) 88 18 155/87 99 %      Temp Source Heart Rate Source Patient Position - Orthostatic VS BP Location FiO2 (%)   Temporal Monitor Sitting Left arm --      Pain Score       --           Vitals:    12/30/23 1801   BP: 155/87   Pulse: 88   Patient Position - Orthostatic VS: Sitting         Visual Acuity      ED Medications  Medications   morphine injection 4 mg (has no administration in time range)       Diagnostic Studies  Results Reviewed       None                   XR ankle 3+ views LEFT   ED Interpretation by Rommel Perez MD (12/30 1827)   ? Non displaced tibial fracture                  Procedures  Procedures         ED Course                               SBIRT 22yo+      Flowsheet Row Most Recent Value   Initial Alcohol Screen: US AUDIT-C     1. How often do you have a drink containing alcohol? 0 Filed at: 12/30/2023 1804   2. How many drinks containing alcohol do you have on a typical day you are drinking?  0 Filed at: 12/30/2023 1804   3b. FEMALE Any Age, or MALE 65+: How often do you have 4 or more drinks on one occassion? 0 Filed at: 12/30/2023 1804   Audit-C Score 0 Filed at: 12/30/2023 1804   LAUREN: How many times in the past year have you...    Used an illegal drug or used a prescription medication for non-medical reasons? Never Filed at: 12/30/2023 1804                      Medical Decision Making  I have independently visualized the x-ray of the ankle.  It shows a nondisplaced posterior fracture of the tibia.  No angulation or displacement.    Short leg posterior Orthoglass splint (static) was place by TECH. Examined by me post application. Alignment normal. NeuroVascular exam post placement is  intact.      Problems Addressed:  Ankle fracture: acute illness or injury    Amount and/or Complexity of Data Reviewed  Radiology: ordered and independent interpretation performed.  Discussion of management or test interpretation with external provider(s): Patient clinical presentation is benign.    Meaning patient's vital signs are normal and stable ED Triage Vitals [12/30/23 1801]  Temperature: 97.8 °F (36.6 °C)  Pulse: 88  Respirations: 18  Blood Pressure: 155/87  SpO2: 99 %  Temp Source: Temporal  Heart Rate Source: Monitor  Patient Position - Orthostatic VS: Sitting  BP Location: Left arm  FiO2 (%): n/a  Pain Score: n/a.    Patient in no distress.    Chief complaint, vital signs, physical examination does not suggest an acute medical emergency at this time.       Risk  OTC drugs.  Prescription drug management.             Disposition  Final diagnoses:   Ankle fracture   Closed fracture of left ankle, initial encounter     Time reflects when diagnosis was documented in both MDM as applicable and the Disposition within this note       Time User Action Codes Description Comment    12/30/2023  6:28 PM Rommel Perez Add [S82.899A] Ankle fracture     12/30/2023  6:29 PM Rommel Perez Add [S82.892A] Closed fracture of left ankle, initial encounter           ED Disposition       ED Disposition   Discharge    Condition   Stable    Date/Time   Sat Dec 30, 2023 1828    Comment   Sadie Bazzi discharge to home/self care.                   Follow-up Information       Follow up With Specialties Details Why Contact Info Additional Information    [facility] In 1 week  [address]  [facility] [address] [phone] [facility] [address], [place], [place], [address] [phone]            Patient's Medications   Discharge Prescriptions    No medications on file       No  discharge procedures on file.    PDMP Review       None            ED Provider  Electronically Signed by             Rommel Perez MD  12/30/23 3083

## 2023-12-30 NOTE — DISCHARGE INSTRUCTIONS
A  personal message from Dr. Rommel Perez,  Thank you so much for allowing me to care for you today.    I pride myself in the care and attention I give all my patients.  I hope you were a witness to this tonight.   If for any reason your condition does not improve or worsens, or you have a question that was not answered during your visit you can feel free to text me on my personal phone #  # [phone].   I will answer to your message and continue your care past your emergency room visit.     Please understand that although you are being discharged because your condition has been deemed stable and able to be managed on an outpatient setting. However your condition may worsen as part of the natural progression of the illness/condition, if this occurs please come back to the emergency department for a repeat evaluation.

## 2024-01-03 ENCOUNTER — OFFICE VISIT (OUTPATIENT)
Dept: OBGYN CLINIC | Facility: CLINIC | Age: 54
End: 2024-01-03
Payer: COMMERCIAL

## 2024-01-03 ENCOUNTER — HOSPITAL ENCOUNTER (OUTPATIENT)
Dept: CT IMAGING | Facility: CLINIC | Age: 54
Discharge: HOME | End: 2024-01-03
Payer: COMMERCIAL

## 2024-01-03 VITALS
SYSTOLIC BLOOD PRESSURE: 144 MMHG | BODY MASS INDEX: 33.63 KG/M2 | RESPIRATION RATE: 18 BRPM | WEIGHT: 197 LBS | DIASTOLIC BLOOD PRESSURE: 92 MMHG | HEIGHT: 64 IN | HEART RATE: 78 BPM | OXYGEN SATURATION: 98 %

## 2024-01-03 DIAGNOSIS — S82.392A CLOSED FRACTURE OF POSTERIOR MALLEOLUS OF LEFT TIBIA, INITIAL ENCOUNTER: Primary | ICD-10-CM

## 2024-01-03 DIAGNOSIS — S82.392A CLOSED FRACTURE OF POSTERIOR MALLEOLUS OF LEFT TIBIA, INITIAL ENCOUNTER: ICD-10-CM

## 2024-01-03 PROCEDURE — 73700 CT LOWER EXTREMITY W/O DYE: CPT

## 2024-01-03 PROCEDURE — 99203 OFFICE O/P NEW LOW 30 MIN: CPT | Performed by: ORTHOPAEDIC SURGERY

## 2024-01-03 RX ORDER — CELECOXIB 100 MG/1
100 CAPSULE ORAL 2 TIMES DAILY
Qty: 28 CAPSULE | Refills: 0 | Status: SHIPPED | OUTPATIENT
Start: 2024-01-03 | End: 2024-01-17

## 2024-01-03 NOTE — PROGRESS NOTES
HPI:  Patient is a 53 y.o. year old female who presents with left ankle pain after following while roller skating 12/30/23. She doesn't remember how she fell. She was treated in the ED with a splint. Her pain today is rated 8/10. She presents in a wheelchair. She has been NWB on crutches.      ROS:   General: No fever, no chills, no weight loss, no weight gain  HEENT:  No loss of hearing, no nose bleeds, no sore throat  Eyes:  No eye pain, no red eyes, no visual disturbance  Respiratory:  No cough, no shortness of breath, no wheezing  Cardiovascular:  No chest pain, no palpitations, no edema  GI: No abdominal pain, no nausea, no vomiting  Endocrine: No frequent urination, no excessive thirst  Urinary:  No dysuria, no hematuria, no incontinence  Musculoskeletal: see HPI and PE  Skin:  No rash, no wounds  Neurological:  No dizziness, no headache, no numbness  Psychiatric:  No difficulty concentrating, no depression, no suicide thoughts, no anxiety  Review of all other systems is negative    PMH:  Past Medical History:   Diagnosis Date    Fractures 12/30/2024       PSH:  Past Surgical History:   Procedure Laterality Date    HYSTERECTOMY      PILONIDAL CYST EXCISION N/A 5/27/2020    Procedure: EXCISION PILONIDAL CYST;  Surgeon: Ricardo Sinha MD;  Location: [facility];  Service: General    TUBAL LIGATION      TUMOR REMOVAL      scalp        Medications:  Current Outpatient Medications   Medication Sig Dispense Refill    albuterol (ProAir HFA) 90 mcg/act inhaler Inhale 2 puffs every 6 (six) hours as needed for wheezing 8.5 g 0    celecoxib (CeleBREX) 100 mg capsule Take 1 capsule (100 mg total) by mouth 2 (two) times a day for 14 days 28 capsule 0    valACYclovir (VALTREX) 500 mg tablet Take 1 tablet by mouth 2 (two) times a day for 3 days 6 tablet 0     No current facility-administered medications for this visit.       Allergies:  Allergies   Allergen Reactions    Other Hives     Apricots         Family  "History:  Family History   Problem Relation Age of Onset    Diabetes Mother     Hypertension Mother     No Known Problems Father        Social History:  Social History     Occupational History    Not on file   Tobacco Use    Smoking status: Former     Current packs/day: 0.20     Types: Cigarettes    Smokeless tobacco: Never   Vaping Use    Vaping status: Never Used   Substance and Sexual Activity    Alcohol use: Not Currently     Comment: occ    Drug use: No    Sexual activity: Not on file       Physical Exam:  General :  Alert, cooperative, no distress, appears stated age  Blood pressure 144/92, pulse 78, resp. rate 18, height 5' 4\" (1.626 m), weight 89.4 kg (197 lb), SpO2 98%, not currently breastfeeding.   Head:  Normocephalic, without obvious abnormality, atraumatic   Eyes:  Conjunctiva/corneas clear, EOM's intact,   Ears:  Both ears normal appearance, no hearing deficits.    Nose: Nares normal, septum midline, no drainage    Neck: Supple,  trachea midline, no adenopathy, no tenderness, no mass   Back:   Symmetric, no curvature, ROM normal, no tenderness   Lungs:   Respirations unlabored   Chest Wall:  No tenderness or deformity   Extremities: Extremities normal, atraumatic, no cyanosis or edema      Pulses: 2+ and symmetric   Skin: Skin color, texture, turgor normal, no rashes or lesions      Neurologic: Normal           Ortho Exam  Left Ankle  There is normal range of motion of toes in plantar flexion and dorsiflexion.   There is swelling and ecchymosis present over the lateral ankle.   There is tenderness present over the ATFL, minimal lateral malleolus and medial malleolus tenderness.     Sensation is intact to light touch superficial peroneal, deep peroneal, tibial, saphenous, and sural nerve distributions.    2+ DP pulse present.    Imaging Studies:     The following imaging studies were reviewed in office today. My findings are noted.  X-rays of the left ankle obtained 12/20/23 demonstrate minimally " displaced posterior tibial malleolar fracture.    Assessment  Encounter Diagnosis   Name Primary?    Closed fracture of posterior malleolus of left tibia, initial encounter Yes         Plan:  53 y.o female with left posterior malleolus fracture  X-rays reviewed in office today  CT scan was ordered to better visualize fracture  Patient was placed in a CAM boot in the office today  Remain NWB left lower extremity  Celebrex 100 mg was sent to the patient's pharmacy on file  She will follow up after CT scan    Scribe Attestation      I,:  Karlene Duenas am acting as a scribe while in the presence of the attending physician.:       I,:  Freida Gilbert MD personally performed the services described in this documentation    as scribed in my presence.:

## 2024-01-04 ENCOUNTER — TELEPHONE (OUTPATIENT)
Age: 54
End: 2024-01-04

## 2024-01-04 PROBLEM — S82.392A CLOSED FRACTURE OF POSTERIOR MALLEOLUS OF LEFT TIBIA, INITIAL ENCOUNTER: Status: ACTIVE | Noted: 2024-01-04

## 2024-01-04 NOTE — TELEPHONE ENCOUNTER
Caller: Patient    Doctor: Vladimir    Reason for call:     Patient is calling asking if her appointment can be a phone call instead, appt is on   1/5/24, time is 10:45 am, apparently she has another doctor visit at the same time.    Call back#: [phone]

## 2024-01-04 NOTE — TELEPHONE ENCOUNTER
Call this patient explained that Dr is not in the office today he is in the OR and may not be able to get back to her. He will review the results at her appointment tomorrow.

## 2024-01-04 NOTE — TELEPHONE ENCOUNTER
Call to this patient to make her aware dr Gilbert is in surgery today and we may not have an answer to her question until early morning. However we will check with him in the morning and give her a call.

## 2024-01-04 NOTE — TELEPHONE ENCOUNTER
Caller: Patient    Doctor: Vladimir    Reason for call: Patient stated she was told 1/3 that Dr Gilbert would verbally tell her the results of her CT prior to her appt 1/5. Please contact patient to advise    Call back#: [phone]

## 2024-01-05 ENCOUNTER — OFFICE VISIT (OUTPATIENT)
Dept: OBGYN CLINIC | Facility: CLINIC | Age: 54
End: 2024-01-05
Payer: COMMERCIAL

## 2024-01-05 VITALS
DIASTOLIC BLOOD PRESSURE: 84 MMHG | SYSTOLIC BLOOD PRESSURE: 132 MMHG | OXYGEN SATURATION: 94 % | HEART RATE: 79 BPM | RESPIRATION RATE: 18 BRPM

## 2024-01-05 DIAGNOSIS — S82.392A CLOSED FRACTURE OF POSTERIOR MALLEOLUS OF LEFT TIBIA, INITIAL ENCOUNTER: Primary | ICD-10-CM

## 2024-01-05 PROCEDURE — 99213 OFFICE O/P EST LOW 20 MIN: CPT | Performed by: ORTHOPAEDIC SURGERY

## 2024-01-05 NOTE — PROGRESS NOTES
HPI:  Patient is a 53 y.o. year old female who presents for follow up evaluation of her left posterior malleolus fracture. Patient was last seen in the office 1/03/2024 when a STAT CT scan was ordered and she was placed NWB in a CAM boot. She rates her pain at a 7/10 today. She has maintained her CAM boot and has been NWB on crutches (for the most part).      ROS:   General: No fever, no chills, no weight loss, no weight gain  HEENT:  No loss of hearing, no nose bleeds, no sore throat  Eyes:  No eye pain, no red eyes, no visual disturbance  Respiratory:  No cough, no shortness of breath, no wheezing  Cardiovascular:  No chest pain, no palpitations, no edema  GI: No abdominal pain, no nausea, no vomiting  Endocrine: No frequent urination, no excessive thirst  Urinary:  No dysuria, no hematuria, no incontinence  Musculoskeletal: see HPI and PE  Skin:  No rash, no wounds  Neurological:  No dizziness, no headache, no numbness  Psychiatric:  No difficulty concentrating, no depression, no suicide thoughts, no anxiety  Review of all other systems is negative    PMH:  Past Medical History:   Diagnosis Date    Fractures 12/30/2024       PSH:  Past Surgical History:   Procedure Laterality Date    HYSTERECTOMY      PILONIDAL CYST EXCISION N/A 5/27/2020    Procedure: EXCISION PILONIDAL CYST;  Surgeon: Ricardo Sinha MD;  Location: [facility];  Service: General    TUBAL LIGATION      TUMOR REMOVAL      scalp        Medications:  Current Outpatient Medications   Medication Sig Dispense Refill    albuterol (ProAir HFA) 90 mcg/act inhaler Inhale 2 puffs every 6 (six) hours as needed for wheezing 8.5 g 0    celecoxib (CeleBREX) 100 mg capsule Take 1 capsule (100 mg total) by mouth 2 (two) times a day for 14 days 28 capsule 0    valACYclovir (VALTREX) 500 mg tablet Take 1 tablet by mouth 2 (two) times a day for 3 days 6 tablet 0     No current facility-administered medications for this visit.       Allergies:  Allergies   Allergen  Reactions    Other Hives     Apricots         Family History:  Family History   Problem Relation Age of Onset    Diabetes Mother     Hypertension Mother     No Known Problems Father        Social History:  Social History     Occupational History    Not on file   Tobacco Use    Smoking status: Former     Current packs/day: 0.20     Types: Cigarettes    Smokeless tobacco: Never   Vaping Use    Vaping status: Never Used   Substance and Sexual Activity    Alcohol use: Not Currently     Comment: occ    Drug use: No    Sexual activity: Not on file       Physical Exam:  General :  Alert, cooperative, no distress, appears stated age  Blood pressure 132/84, pulse 79, resp. rate 18, SpO2 94%, not currently breastfeeding.   Head:  Normocephalic, without obvious abnormality, atraumatic   Eyes:  Conjunctiva/corneas clear, EOM's intact,   Ears:  Both ears normal appearance, no hearing deficits.    Nose: Nares normal, septum midline, no drainage    Neck: Supple,  trachea midline, no adenopathy, no tenderness, no mass   Back:   Symmetric, no curvature, ROM normal, no tenderness   Lungs:   Respirations unlabored   Chest Wall:  No tenderness or deformity   Extremities: Extremities normal, atraumatic, no cyanosis or edema      Pulses: 2+ and symmetric   Skin: Skin color, texture, turgor normal, no rashes or lesions      Neurologic: Normal           Ortho Exam  Left Ankle  There is normal range of motion of toes in plantar flexion and dorsiflexion.   There is swelling and ecchymosis present over the lateral ankle.   There is tenderness present over the ATFL, minimal lateral malleolus and medial malleolus tenderness.   Minimal anterior or posterior syndesmosis tenderness    Sensation is intact to light touch superficial peroneal, deep peroneal, tibial, saphenous, and sural nerve distributions.    2+ DP pulse present.    Imaging Studies:     The following imaging studies were reviewed in office today. My findings are noted.  CT scan of  the left ankle demonstrates longitudinal fracture transversing the width of the posterior malleolus into the tibial metadiaphysis without displacement.     Assessment  Encounter Diagnosis   Name Primary?    Closed fracture of posterior malleolus of left tibia, initial encounter Yes         Plan:  53 y.o female with left posterior malleolus fracture  CT scan reviewed in office today  Treatment options were discussed including nonoperative vs operative management  Continue wearing the CAM boot  Continue NWB on crutches. Discussed the risk of displacement if she began WB too early  Patient will follow up in one week for reevaluation and new x-rays.  Patient is going to get a knee roller to help maintain non weightbearing status.    Scribe Attestation      I,:  Karlene Duenas am acting as a scribe while in the presence of the attending physician.:       I,:  Freida Gilbert MD personally performed the services described in this documentation    as scribed in my presence.:

## 2024-01-12 ENCOUNTER — TELEPHONE (OUTPATIENT)
Age: 54
End: 2024-01-12

## 2024-01-12 NOTE — TELEPHONE ENCOUNTER
Hello,  Please advise if the following patient can be forced onto the schedule:    Patient: Sadie Bazzi    : 68    MRN: [id number]    Call back #: [phone]    Insurance: [employer]     Reason for appointment: 1 week f/u, left ankle-she missed her 8:45 appt today    Requested doctor/location: Vladimir/[facility]      Thank you.

## 2024-01-17 ENCOUNTER — OFFICE VISIT (OUTPATIENT)
Dept: OBGYN CLINIC | Facility: CLINIC | Age: 54
End: 2024-01-17
Payer: COMMERCIAL

## 2024-01-17 ENCOUNTER — APPOINTMENT (OUTPATIENT)
Dept: RADIOLOGY | Facility: CLINIC | Age: 54
End: 2024-01-17
Payer: COMMERCIAL

## 2024-01-17 VITALS
RESPIRATION RATE: 18 BRPM | BODY MASS INDEX: 33.63 KG/M2 | SYSTOLIC BLOOD PRESSURE: 147 MMHG | HEIGHT: 64 IN | OXYGEN SATURATION: 98 % | WEIGHT: 197 LBS | DIASTOLIC BLOOD PRESSURE: 67 MMHG | HEART RATE: 76 BPM

## 2024-01-17 DIAGNOSIS — S82.392D CLOSED FRACTURE OF POSTERIOR MALLEOLUS OF LEFT TIBIA WITH ROUTINE HEALING, SUBSEQUENT ENCOUNTER: Primary | ICD-10-CM

## 2024-01-17 DIAGNOSIS — S82.392D CLOSED FRACTURE OF POSTERIOR MALLEOLUS OF LEFT TIBIA WITH ROUTINE HEALING, SUBSEQUENT ENCOUNTER: ICD-10-CM

## 2024-01-17 PROCEDURE — 73610 X-RAY EXAM OF ANKLE: CPT

## 2024-01-17 PROCEDURE — 99213 OFFICE O/P EST LOW 20 MIN: CPT | Performed by: ORTHOPAEDIC SURGERY

## 2024-01-17 NOTE — PROGRESS NOTES
HPI:  Patient is a 53 y.o. year old female who presents for follow up of left posterior malleolus fracture sustained on 12/30/23 while roller skating. She was last seen in the office 01/05/2024 when she was advised to continue wearing the CAM boot and reminded to remain NWB. She has maintained her CAM boot. Although she was advised to remain NWB, she has been full WB. She notes its hard to use crutches on snow and ice. Pain is rated 4/10 and she is not taking any oral medications for pain.      ROS:   General: No fever, no chills, no weight loss, no weight gain  HEENT:  No loss of hearing, no nose bleeds, no sore throat  Eyes:  No eye pain, no red eyes, no visual disturbance  Respiratory:  No cough, no shortness of breath, no wheezing  Cardiovascular:  No chest pain, no palpitations, no edema  GI: No abdominal pain, no nausea, no vomiting  Endocrine: No frequent urination, no excessive thirst  Urinary:  No dysuria, no hematuria, no incontinence  Musculoskeletal: see HPI and PE  Skin:  No rash, no wounds  Neurological:  No dizziness, no headache, no numbness  Psychiatric:  No difficulty concentrating, no depression, no suicide thoughts, no anxiety  Review of all other systems is negative    PMH:  Past Medical History:   Diagnosis Date    Fractures 12/30/2024       PSH:  Past Surgical History:   Procedure Laterality Date    HYSTERECTOMY      PILONIDAL CYST EXCISION N/A 5/27/2020    Procedure: EXCISION PILONIDAL CYST;  Surgeon: Ricardo Sinha MD;  Location: [facility];  Service: General    TUBAL LIGATION      TUMOR REMOVAL      scalp        Medications:  Current Outpatient Medications   Medication Sig Dispense Refill    albuterol (ProAir HFA) 90 mcg/act inhaler Inhale 2 puffs every 6 (six) hours as needed for wheezing 8.5 g 0    celecoxib (CeleBREX) 100 mg capsule Take 1 capsule (100 mg total) by mouth 2 (two) times a day for 14 days 28 capsule 0    valACYclovir (VALTREX) 500 mg tablet Take 1 tablet by mouth 2 (two)  "times a day for 3 days 6 tablet 0     No current facility-administered medications for this visit.       Allergies:  Allergies   Allergen Reactions    Other Hives     Apricots         Family History:  Family History   Problem Relation Age of Onset    Diabetes Mother     Hypertension Mother     No Known Problems Father        Social History:  Social History     Occupational History    Not on file   Tobacco Use    Smoking status: Former     Current packs/day: 0.20     Types: Cigarettes    Smokeless tobacco: Never   Vaping Use    Vaping status: Never Used   Substance and Sexual Activity    Alcohol use: Not Currently     Comment: occ    Drug use: No    Sexual activity: Not on file       Physical Exam:  General :  Alert, cooperative, no distress, appears stated age  Blood pressure 147/67, pulse 76, resp. rate 18, height 5' 4\" (1.626 m), weight 89.4 kg (197 lb), SpO2 98%, not currently breastfeeding.   Head:  Normocephalic, without obvious abnormality, atraumatic   Eyes:  Conjunctiva/corneas clear, EOM's intact,   Ears:  Both ears normal appearance, no hearing deficits.    Nose: Nares normal, septum midline, no drainage    Neck: Supple,  trachea midline, no adenopathy, no tenderness, no mass   Back:   Symmetric, no curvature, ROM normal, no tenderness   Lungs:   Respirations unlabored   Chest Wall:  No tenderness or deformity   Extremities: Extremities normal, atraumatic, no cyanosis or edema      Pulses: 2+ and symmetric   Skin: Skin color, texture, turgor normal, no rashes or lesions      Neurologic: Normal           Ortho Exam  Left Ankle  There is normal range of motion of toes in plantar flexion and dorsiflexion.   There is swelling and ecchymosis present over the lateral ankle.   There is minimal tenderness present over the ATFL. No lateral or medial malleolus tenderness.     Sensation is intact to light touch superficial peroneal, deep peroneal, tibial, saphenous, and sural nerve distributions.    2+ DP pulse " present.    Imaging Studies:     The following imaging studies were reviewed in office today. My findings are noted.  X-rays of the left ankle obtained 01/17/2024 demonstrate increased healing and maintenance of alignment of posterior malleolus fracture    Assessment  Encounter Diagnosis   Name Primary?    Closed fracture of posterior malleolus of left tibia with routine healing, subsequent encounter Yes         Plan:  53 y.o. female with left posterior malleolus fracture  X-rays reviewed in the office today  Discussed the importance of remaining NWB  Remain NWB for the next 3 weeks. Continue to wear CAM boot at all times  Patient will follow up in 2 weeks for repeat evaluation and new x-rays    Scribe Attestation      I,:  Karlene Duenas am acting as a scribe while in the presence of the attending physician.:       I,:  Freida Gilbert MD personally performed the services described in this documentation    as scribed in my presence.:

## 2024-02-07 ENCOUNTER — OFFICE VISIT (OUTPATIENT)
Dept: OBGYN CLINIC | Facility: CLINIC | Age: 54
End: 2024-02-07
Payer: COMMERCIAL

## 2024-02-07 ENCOUNTER — APPOINTMENT (OUTPATIENT)
Dept: RADIOLOGY | Facility: CLINIC | Age: 54
End: 2024-02-07
Payer: COMMERCIAL

## 2024-02-07 VITALS
BODY MASS INDEX: 34.15 KG/M2 | RESPIRATION RATE: 18 BRPM | SYSTOLIC BLOOD PRESSURE: 132 MMHG | WEIGHT: 200 LBS | HEIGHT: 64 IN | OXYGEN SATURATION: 98 % | HEART RATE: 73 BPM | DIASTOLIC BLOOD PRESSURE: 87 MMHG

## 2024-02-07 DIAGNOSIS — S82.392D CLOSED FRACTURE OF POSTERIOR MALLEOLUS OF LEFT TIBIA WITH ROUTINE HEALING, SUBSEQUENT ENCOUNTER: Primary | ICD-10-CM

## 2024-02-07 DIAGNOSIS — S82.392A CLOSED FRACTURE OF POSTERIOR MALLEOLUS OF LEFT TIBIA, INITIAL ENCOUNTER: ICD-10-CM

## 2024-02-07 DIAGNOSIS — S82.392D CLOSED FRACTURE OF POSTERIOR MALLEOLUS OF LEFT TIBIA WITH ROUTINE HEALING, SUBSEQUENT ENCOUNTER: ICD-10-CM

## 2024-02-07 PROCEDURE — 73610 X-RAY EXAM OF ANKLE: CPT

## 2024-02-07 PROCEDURE — 99213 OFFICE O/P EST LOW 20 MIN: CPT | Performed by: ORTHOPAEDIC SURGERY

## 2024-02-07 NOTE — LETTER
February 7, 2024     Patient: Sadie Bazzi  YOB: 1970  Date of Visit: 2/7/2024      To Whom it May Concern:    Sadie Bazzi is under my professional care. Sadie was seen in my office on 2/7/2024. Sadie may return to work without restriction.     If you have any questions or concerns, please don't hesitate to call.         Sincerely,          Freida Gilbert MD        CC: No Recipients

## 2024-02-07 NOTE — PROGRESS NOTES
HPI:  Patient is a 53 y.o. year old female who presents for follow up of left posterior malleolus fracture, DOI: 12/30/2023. She was last seen in the office 1/17/24 when she was instructed to remain NWB and to continue wearing her CAM boot. She is not experiencing any pain today. She admits that she discontinued her CAM boot 3-4 days ago and has been weight bearing since the beginning.  She still notes mild swelling in her ankle. She does not take anything for pain management.       ROS:   General: No fever, no chills, no weight loss, no weight gain  HEENT:  No loss of hearing, no nose bleeds, no sore throat  Eyes:  No eye pain, no red eyes, no visual disturbance  Respiratory:  No cough, no shortness of breath, no wheezing  Cardiovascular:  No chest pain, no palpitations, no edema  GI: No abdominal pain, no nausea, no vomiting  Endocrine: No frequent urination, no excessive thirst  Urinary:  No dysuria, no hematuria, no incontinence  Musculoskeletal: see HPI and PE  Skin:  No rash, no wounds  Neurological:  No dizziness, no headache, no numbness  Psychiatric:  No difficulty concentrating, no depression, no suicide thoughts, no anxiety  Review of all other systems is negative    PMH:  Past Medical History:   Diagnosis Date    Fractures 12/30/2024       PSH:  Past Surgical History:   Procedure Laterality Date    HYSTERECTOMY      PILONIDAL CYST EXCISION N/A 5/27/2020    Procedure: EXCISION PILONIDAL CYST;  Surgeon: Ricardo Sinha MD;  Location: [facility];  Service: General    TUBAL LIGATION      TUMOR REMOVAL      scalp        Medications:  Current Outpatient Medications   Medication Sig Dispense Refill    albuterol (ProAir HFA) 90 mcg/act inhaler Inhale 2 puffs every 6 (six) hours as needed for wheezing 8.5 g 0    celecoxib (CeleBREX) 100 mg capsule Take 1 capsule (100 mg total) by mouth 2 (two) times a day for 14 days 28 capsule 0    valACYclovir (VALTREX) 500 mg tablet Take 1 tablet by mouth 2 (two) times a day  "for 3 days 6 tablet 0     No current facility-administered medications for this visit.       Allergies:  Allergies   Allergen Reactions    Other Hives     Apricots         Family History:  Family History   Problem Relation Age of Onset    Diabetes Mother     Hypertension Mother     No Known Problems Father        Social History:  Social History     Occupational History    Not on file   Tobacco Use    Smoking status: Former     Current packs/day: 0.20     Types: Cigarettes    Smokeless tobacco: Never   Vaping Use    Vaping status: Never Used   Substance and Sexual Activity    Alcohol use: Not Currently     Comment: occ    Drug use: No    Sexual activity: Yes     Partners: Male       Physical Exam:  General :  Alert, cooperative, no distress, appears stated age  Blood pressure 132/87, pulse 73, resp. rate 18, height 5' 4\" (1.626 m), weight 90.7 kg (200 lb), SpO2 98%, not currently breastfeeding.   Head:  Normocephalic, without obvious abnormality, atraumatic   Eyes:  Conjunctiva/corneas clear, EOM's intact,   Ears:  Both ears normal appearance, no hearing deficits.    Nose: Nares normal, septum midline, no drainage    Neck: Supple,  trachea midline, no adenopathy, no tenderness, no mass   Back:   Symmetric, no curvature, ROM normal, no tenderness   Lungs:   Respirations unlabored   Chest Wall:  No tenderness or deformity   Extremities: Extremities normal, atraumatic, no cyanosis or edema      Pulses: 2+ and symmetric   Skin: Skin color, texture, turgor normal, no rashes or lesions      Neurologic: Normal           Ortho Exam  Left Ankle  Active range of motion maintained dorsiflexion and plantarflexion  There is normal range of motion of toes in plantar flexion and dorsiflexion.   There is mild swelling and no ecchymosis present over the ATFL.   There is no tenderness present over the ankle.     There is no pain with resisted range of motion.    Sensation is intact to light touch superficial peroneal, deep peroneal, " tibial, saphenous, and sural nerve distributions.    2+ DP pulse present.    Imaging Studies:     The following imaging studies were reviewed in office today. My findings are noted.  X-rays of the left ankle obtained 02/07/2024 demonstrate increased healing at non displaced posterior malleolus fracture.     Assessment  Encounter Diagnoses   Name Primary?    Closed fracture of posterior malleolus of left tibia with routine healing, subsequent encounter Yes    Closed fracture of posterior malleolus of left tibia, initial encounter          Plan:  53 y.o female with left posterior malleolus fracture  X-rays taken and reviewed in the office today  Wear CAM boot with any heavy lifting activity  She may return to work. A note was provided  Discussed she should do protected activity at this time  She will follow up in 4 weeks for reevaluation and new x-rays    Scribe Attestation      I,:  Karlene Duenas am acting as a scribe while in the presence of the attending physician.:       I,:  Freida Gilbert MD personally performed the services described in this documentation    as scribed in my presence.:

## 2025-02-18 ENCOUNTER — APPOINTMENT (EMERGENCY)
Dept: CT IMAGING | Facility: HOSPITAL | Age: 55
End: 2025-02-18
Payer: COMMERCIAL

## 2025-02-18 ENCOUNTER — HOSPITAL ENCOUNTER (EMERGENCY)
Facility: HOSPITAL | Age: 55
Discharge: HOME | End: 2025-02-19
Attending: EMERGENCY MEDICINE | Admitting: EMERGENCY MEDICINE
Payer: COMMERCIAL

## 2025-02-18 DIAGNOSIS — R10.31 INGUINAL PAIN OF BOTH SIDES: Primary | ICD-10-CM

## 2025-02-18 DIAGNOSIS — R10.32 INGUINAL PAIN OF BOTH SIDES: Primary | ICD-10-CM

## 2025-02-18 LAB
ALBUMIN SERPL BCG-MCNC: 4 G/DL (ref 3.5–5)
ALP SERPL-CCNC: 91 U/L (ref 34–104)
ALT SERPL W P-5'-P-CCNC: 16 U/L (ref 7–52)
ANION GAP SERPL CALCULATED.3IONS-SCNC: 6 MMOL/L (ref 4–13)
AST SERPL W P-5'-P-CCNC: 15 U/L (ref 13–39)
BASOPHILS # BLD AUTO: 0.05 THOUSANDS/ΜL (ref 0–0.1)
BASOPHILS NFR BLD AUTO: 1 % (ref 0–1)
BILIRUB SERPL-MCNC: 0.3 MG/DL (ref 0.2–1)
BILIRUB UR QL STRIP: NEGATIVE
BUN SERPL-MCNC: 13 MG/DL (ref 5–25)
CALCIUM SERPL-MCNC: 8.7 MG/DL (ref 8.4–10.2)
CHLORIDE SERPL-SCNC: 105 MMOL/L (ref 96–108)
CLARITY UR: CLEAR
CO2 SERPL-SCNC: 28 MMOL/L (ref 21–32)
COLOR UR: ABNORMAL
CREAT SERPL-MCNC: 0.58 MG/DL (ref 0.6–1.3)
EOSINOPHIL # BLD AUTO: 0.17 THOUSAND/ΜL (ref 0–0.61)
EOSINOPHIL NFR BLD AUTO: 2 % (ref 0–6)
ERYTHROCYTE [DISTWIDTH] IN BLOOD BY AUTOMATED COUNT: 15.4 % (ref 11.6–15.1)
GFR SERPL CREATININE-BSD FRML MDRD: 104 ML/MIN/1.73SQ M
GLUCOSE SERPL-MCNC: 97 MG/DL (ref 65–140)
GLUCOSE UR STRIP-MCNC: NEGATIVE MG/DL
HCT VFR BLD AUTO: 36.5 % (ref 34.8–46.1)
HGB BLD-MCNC: 11.3 G/DL (ref 11.5–15.4)
HGB UR QL STRIP.AUTO: NEGATIVE
IMM GRANULOCYTES # BLD AUTO: 0.03 THOUSAND/UL (ref 0–0.2)
IMM GRANULOCYTES NFR BLD AUTO: 0 % (ref 0–2)
KETONES UR STRIP-MCNC: NEGATIVE MG/DL
LEUKOCYTE ESTERASE UR QL STRIP: NEGATIVE
LIPASE SERPL-CCNC: 21 U/L (ref 11–82)
LYMPHOCYTES # BLD AUTO: 2.34 THOUSANDS/ΜL (ref 0.6–4.47)
LYMPHOCYTES NFR BLD AUTO: 26 % (ref 14–44)
MCH RBC QN AUTO: 25.8 PG (ref 26.8–34.3)
MCHC RBC AUTO-ENTMCNC: 31 G/DL (ref 31.4–37.4)
MCV RBC AUTO: 83 FL (ref 82–98)
MONOCYTES # BLD AUTO: 0.96 THOUSAND/ΜL (ref 0.17–1.22)
MONOCYTES NFR BLD AUTO: 11 % (ref 4–12)
NEUTROPHILS # BLD AUTO: 5.43 THOUSANDS/ΜL (ref 1.85–7.62)
NEUTS SEG NFR BLD AUTO: 60 % (ref 43–75)
NITRITE UR QL STRIP: NEGATIVE
NRBC BLD AUTO-RTO: 0 /100 WBCS
PH UR STRIP.AUTO: 6 [PH]
PLATELET # BLD AUTO: 222 THOUSANDS/UL (ref 149–390)
PMV BLD AUTO: 11.4 FL (ref 8.9–12.7)
POTASSIUM SERPL-SCNC: 3.6 MMOL/L (ref 3.5–5.3)
PROT SERPL-MCNC: 7.6 G/DL (ref 6.4–8.4)
PROT UR STRIP-MCNC: NEGATIVE MG/DL
RBC # BLD AUTO: 4.38 MILLION/UL (ref 3.81–5.12)
SODIUM SERPL-SCNC: 139 MMOL/L (ref 135–147)
SP GR UR STRIP.AUTO: 1.03 (ref 1–1.03)
UROBILINOGEN UR STRIP-ACNC: 2 MG/DL
WBC # BLD AUTO: 8.98 THOUSAND/UL (ref 4.31–10.16)

## 2025-02-18 PROCEDURE — 99285 EMERGENCY DEPT VISIT HI MDM: CPT | Performed by: EMERGENCY MEDICINE

## 2025-02-18 PROCEDURE — 36415 COLL VENOUS BLD VENIPUNCTURE: CPT | Performed by: EMERGENCY MEDICINE

## 2025-02-18 PROCEDURE — 83690 ASSAY OF LIPASE: CPT | Performed by: EMERGENCY MEDICINE

## 2025-02-18 PROCEDURE — 99284 EMERGENCY DEPT VISIT MOD MDM: CPT

## 2025-02-18 PROCEDURE — 81003 URINALYSIS AUTO W/O SCOPE: CPT | Performed by: EMERGENCY MEDICINE

## 2025-02-18 PROCEDURE — 74177 CT ABD & PELVIS W/CONTRAST: CPT

## 2025-02-18 PROCEDURE — 85025 COMPLETE CBC W/AUTO DIFF WBC: CPT | Performed by: EMERGENCY MEDICINE

## 2025-02-18 PROCEDURE — 96374 THER/PROPH/DIAG INJ IV PUSH: CPT

## 2025-02-18 PROCEDURE — 80053 COMPREHEN METABOLIC PANEL: CPT | Performed by: EMERGENCY MEDICINE

## 2025-02-18 RX ORDER — KETOROLAC TROMETHAMINE 30 MG/ML
15 INJECTION, SOLUTION INTRAMUSCULAR; INTRAVENOUS ONCE
Status: COMPLETED | OUTPATIENT
Start: 2025-02-18 | End: 2025-02-18

## 2025-02-18 RX ADMIN — KETOROLAC TROMETHAMINE 15 MG: 30 INJECTION, SOLUTION INTRAMUSCULAR; INTRAVENOUS at 22:02

## 2025-02-18 RX ADMIN — IOHEXOL 100 ML: 350 INJECTION, SOLUTION INTRAVENOUS at 23:45

## 2025-02-18 NOTE — Clinical Note
Sadie Bazzi was seen and treated in our emergency department on 2/18/2025.    No restrictions            Diagnosis:     Sadie  may return to work on return date.    She may return on this date: 02/21/2025         If you have any questions or concerns, please don't hesitate to call.      Pamela Bond RN    ______________________________           _______________          _______________  Hospital Representative                              Date                                Time

## 2025-02-19 VITALS
HEART RATE: 80 BPM | TEMPERATURE: 97.6 F | SYSTOLIC BLOOD PRESSURE: 142 MMHG | WEIGHT: 198 LBS | DIASTOLIC BLOOD PRESSURE: 83 MMHG | HEIGHT: 64 IN | OXYGEN SATURATION: 99 % | RESPIRATION RATE: 18 BRPM | BODY MASS INDEX: 33.8 KG/M2

## 2025-02-19 PROCEDURE — 96375 TX/PRO/DX INJ NEW DRUG ADDON: CPT

## 2025-02-19 RX ORDER — FENTANYL CITRATE 50 UG/ML
50 INJECTION, SOLUTION INTRAMUSCULAR; INTRAVENOUS ONCE
Refills: 0 | Status: COMPLETED | OUTPATIENT
Start: 2025-02-19 | End: 2025-02-19

## 2025-02-19 RX ORDER — NAPROXEN 500 MG/1
500 TABLET ORAL 2 TIMES DAILY PRN
Qty: 20 TABLET | Refills: 0 | Status: SHIPPED | OUTPATIENT
Start: 2025-02-19

## 2025-02-19 RX ADMIN — FENTANYL CITRATE 50 MCG: 50 INJECTION INTRAMUSCULAR; INTRAVENOUS at 01:49

## 2025-02-19 NOTE — ED PROVIDER NOTES
"Time reflects when diagnosis was documented in both MDM as applicable and the Disposition within this note       Time User Action Codes Description Comment    2/19/2025  2:09 AM Juan Fry Add [R10.31,  R10.32] Inguinal pain of both sides           ED Disposition       ED Disposition   Discharge    Condition   Stable    Date/Time   Wed Feb 19, 2025  2:09 AM    Comment   Sadie Bazzi discharge to home/self care.                   Assessment & Plan       Medical Decision Making  54 y.o. female presents with a chief complaint of \"I have pain across my pelvic area.\"    Patient affirms 3 days of bilateral inguinal pain on the right greater than the left.   Patient describes pain that came on suddenly without inciting event and continues in the emergency room.      Patient denies vomiting.    Patient denies diarrhea.  Patient denies urinary symptoms.  Patient denies vaginal bleeding or discharge.    Patient denies contacts with similar symptoms.      Patient denies any recent use of antibiotics, international travel, or trauma.    Patient notes history of prior hysterectomy.      Focused Objective Exam:  Abdomen:  Inspection of an obese abdomen without previous abdominal surgical incisions noted without erythema, rashes or ecchymosis noted.  No abdominal pulsations noted.  Normal bowel sounds with no bruit auscultated.  Soft abdomen.  Palpitation noted tenderness bilaterally over the inguinal regions, there is lesser tenderness in the right lower quadrant but the tenderness is most prominent in the right mid inguinal region with no tenderness in the upper abdominal fields; tenderness noted though not maximally over McBurney's point.  No masses or pulsatile aorta noted on examination.  No rebound or guarding noted on examination, non-peritoneal exam.    Back:  No rash or signs of herpes zoster.  No CVA tenderness noted.   Skin:  No ecchymosis of the umbilicus (negative Hiren's sign) or flank (negative Aguero King's " sign). Warm and dry.    Medical Decision Making    Abdominal pain with a broad differential.  Will establish IV access and make patient NPO considering possibility of surgical intervention required.  Will initiate symptomatic management.    Review of the medical record including primary care note, there is a telemedicine note though there is limited information in it.  Per the patient she was told to take ibuprofen which she took yesterday but none today as she states it did not assist her pain..    Differential is broad and includes significant likelihood of intra-abdominal pathology, including concerns for potential hip pathology though the patient denies any falls or trauma that would be the inciting event for this.  There is some pain in the region of the right lower quadrant though this is not the maximal pain, could represent atypical appendicitis.  Patient has pain in the inguinal region but no palpable bulge that would be concerning for hernia and does not have diffuse abdominal pain or vomiting that would be concerning for obstruction.  No urinary symptoms that would be concerning for renal colic as a source of symptoms.    Will obtain CBC to evaluate for anemia and leukocytosis.  Will obtain CMP to evaluate electrolytes, renal, biliary, and hepatic function.  Will obtain lipase to evaluate for potential pancreatitis.    Will obtain urinalysis to evaluate for possible urinary infectious sources and ketones to evaluate potential hydration state.    Based on patient's age, history, physical exam and presenting complaint, will obtain contrast enhanced CT imaging of patient's abdomen and pelvis to further evaluate for possible intraabdominal pathology.      Amount and/or Complexity of Data Reviewed  Labs: ordered.  Radiology: ordered.    Risk  Prescription drug management.        ED Course as of 02/20/25 0223   Wed Feb 19, 2025   0210 Patient's laboratory evaluation only demonstrates mild anemia similar to  prior.  CT imaging without acute findings that would explain the etiology of her symptoms.  Normal-appearing reproductive organs, as such, no indications for ultrasound.  Patient notes improvement in symptoms following treatment.  I discussed this with the patient and the need for continued follow-up considering this.  I discussed and emphasized the need for follow-up and return to the emergency room with any worsening symptoms.       Medications   ketorolac (TORADOL) injection 15 mg (15 mg Intravenous Given 2/18/25 2202)   iohexol (OMNIPAQUE) 350 MG/ML injection (MULTI-DOSE) 100 mL (100 mL Intravenous Given 2/18/25 6865)   fentaNYL injection 50 mcg (50 mcg Intravenous Given 2/19/25 0149)       ED Risk Strat Scores                            SBIRT 20yo+      Flowsheet Row Most Recent Value   Initial Alcohol Screen: US AUDIT-C     1. How often do you have a drink containing alcohol? 0 Filed at: 02/18/2025 2201   2. How many drinks containing alcohol do you have on a typical day you are drinking?  0 Filed at: 02/18/2025 2201   3b. FEMALE Any Age, or MALE 65+: How often do you have 4 or more drinks on one occassion? 0 Filed at: 02/18/2025 2201   Audit-C Score 0 Filed at: 02/18/2025 2201   LAUREN: How many times in the past year have you...    Used an illegal drug or used a prescription medication for non-medical reasons? Never Filed at: 02/18/2025 2201                            History of Present Illness       Chief Complaint   Patient presents with    Pelvic Pain     Pelvic pain x 3 days, + nausea        Past Medical History:   Diagnosis Date    Fractures 12/30/2024      Past Surgical History:   Procedure Laterality Date    HYSTERECTOMY      PILONIDAL CYST EXCISION N/A 5/27/2020    Procedure: EXCISION PILONIDAL CYST;  Surgeon: Ricardo Sinha MD;  Location: [facility];  Service: General    TUBAL LIGATION      TUMOR REMOVAL      scalp       Family History   Problem Relation Age of Onset    Diabetes Mother      Hypertension Mother     No Known Problems Father       Social History     Tobacco Use    Smoking status: Former     Current packs/day: 0.20     Types: Cigarettes    Smokeless tobacco: Never   Vaping Use    Vaping status: Never Used   Substance Use Topics    Alcohol use: Not Currently     Comment: occ    Drug use: No      E-Cigarette/Vaping    E-Cigarette Use Never User       E-Cigarette/Vaping Substances    Nicotine No     THC No     CBD No     Flavoring No     Other No     Unknown No       I have reviewed and agree with the history as documented.     HPI    Review of Systems        Objective       ED Triage Vitals   Temperature Pulse Blood Pressure Respirations SpO2 Patient Position - Orthostatic VS   02/18/25 2038 02/18/25 2038 02/18/25 2038 02/18/25 2038 02/18/25 2038 02/18/25 2038   97.6 °F (36.4 °C) 81 (!) 195/94 20 98 % Sitting      Temp Source Heart Rate Source BP Location FiO2 (%) Pain Score    02/18/25 2038 02/18/25 2038 02/18/25 2038 -- 02/18/25 2202    Temporal Monitor Left arm  10 - Worst Possible Pain      Vitals      Date and Time Temp Pulse SpO2 Resp BP Pain Score FACES Pain Rating User   02/19/25 0149 -- -- -- -- -- 6 -- [place]   02/19/25 0115 -- 80 99 % 18 142/83 -- -- [place]   02/18/25 2202 -- -- -- -- -- 10 - Worst Possible Pain -- [facility]   02/18/25 2038 97.6 °F (36.4 °C) 81 98 % 20 195/94 -- -- [place]            Physical Exam    Results Reviewed       Procedure Component Value Units Date/Time    CBC and differential [[id number]]  (Abnormal) Collected: 02/18/25 2202    Lab Status: Final result Specimen: Blood from Arm, Left Updated: 02/18/25 2244     WBC 8.98 Thousand/uL      RBC 4.38 Million/uL      Hemoglobin 11.3 g/dL      Hematocrit 36.5 %      MCV 83 fL      MCH 25.8 pg      MCHC 31.0 g/dL      RDW 15.4 %      MPV 11.4 fL      Platelets 222 Thousands/uL      nRBC 0 /100 WBCs      Segmented % 60 %      Immature Grans % 0 %      Lymphocytes % 26 %      Monocytes % 11 %      Eosinophils Relative 2 %       Basophils Relative 1 %      Absolute Neutrophils 5.43 Thousands/µL      Absolute Immature Grans 0.03 Thousand/uL      Absolute Lymphocytes 2.34 Thousands/µL      Absolute Monocytes 0.96 Thousand/µL      Eosinophils Absolute 0.17 Thousand/µL      Basophils Absolute 0.05 Thousands/µL     CMP [[id number]]  (Abnormal) Collected: 02/18/25 2202    Lab Status: Final result Specimen: Blood from Arm, Left Updated: 02/18/25 2229     Sodium 139 mmol/L      Potassium 3.6 mmol/L      Chloride 105 mmol/L      CO2 28 mmol/L      ANION GAP 6 mmol/L      BUN 13 mg/dL      Creatinine 0.58 mg/dL      Glucose 97 mg/dL      Calcium 8.7 mg/dL      AST 15 U/L      ALT 16 U/L      Alkaline Phosphatase 91 U/L      Total Protein 7.6 g/dL      Albumin 4.0 g/dL      Total Bilirubin 0.30 mg/dL      eGFR 104 ml/min/1.73sq m     Narrative:      National Kid[employer] Foundation guidelines for Chronic Kidney Disease (CKD):     Stage 1 with normal or high GFR (GFR > 90 mL/min/1.73 square meters)    Stage 2 Mild CKD (GFR = 60-89 mL/min/1.73 square meters)    Stage 3A Moderate CKD (GFR = 45-59 mL/min/1.73 square meters)    Stage 3B Moderate CKD (GFR = 30-44 mL/min/1.73 square meters)    Stage 4 Severe CKD (GFR = 15-29 mL/min/1.73 square meters)    Stage 5 End Stage CKD (GFR <15 mL/min/1.73 square meters)  Note: GFR calculation is accurate only with a steady state creatinine    Lipase [[id number]]  (Normal) Collected: 02/18/25 2202    Lab Status: Final result Specimen: Blood from Arm, Left Updated: 02/18/25 2229     Lipase 21 u/L     UA w Reflex to Microscopic w Reflex to Culture [[id number]]  (Abnormal) Collected: 02/18/25 2202    Lab Status: Final result Specimen: Urine, Clean Catch Updated: 02/18/25 2217     Color, UA Light Yellow     Clarity, UA Clear     Specific Gravity, UA 1.028     pH, UA 6.0     Leukocytes, UA Negative     Nitrite, UA Negative     Protein, UA Negative mg/dl      Glucose, UA Negative mg/dl      Ketones, UA Negative mg/dl       Urobilinogen, UA 2.0 mg/dl      Bilirubin, UA Negative     Occult Blood, UA Negative            CT abdomen pelvis with contrast   Final Interpretation by Zi Danielson MD (02/19 0109)      No evidence of inguinal hernia, inflammation in the groin or abscess. No acute intra-abdominal or pelvic process.         Workstation performed: [id number]             Procedures    ED Medication and Procedure Management   Prior to Admission Medications   Prescriptions Last Dose Informant Patient Reported? Taking?   albuterol (ProAir HFA) 90 mcg/act inhaler  Self No No   Sig: Inhale 2 puffs every 6 (six) hours as needed for wheezing   celecoxib (CeleBREX) 100 mg capsule  Self No No   Sig: Take 1 capsule (100 mg total) by mouth 2 (two) times a day for 14 days   valACYclovir (VALTREX) 500 mg tablet  Self No No   Sig: Take 1 tablet by mouth 2 (two) times a day for 3 days      Facility-Administered Medications: None     Discharge Medication List as of 2/19/2025  2:10 AM        START taking these medications    Details   naproxen (NAPROSYN) 500 mg tablet Take 1 tablet (500 mg total) by mouth 2 (two) times a day as needed for moderate pain for up to 20 doses, Starting Wed 2/19/2025, Print           CONTINUE these medications which have NOT CHANGED    Details   albuterol (ProAir HFA) 90 mcg/act inhaler Inhale 2 puffs every 6 (six) hours as needed for wheezing, Starting Wed 12/29/2021, Normal      celecoxib (CeleBREX) 100 mg capsule Take 1 capsule (100 mg total) by mouth 2 (two) times a day for 14 days, Starting Wed 1/3/2024, Until Wed 2/7/2024, Normal      valACYclovir (VALTREX) 500 mg tablet Take 1 tablet by mouth 2 (two) times a day for 3 days, Starting Sat 1/6/2018, Until Wed 2/7/2024, Print           No discharge procedures on file.  ED SEPSIS DOCUMENTATION   Time reflects when diagnosis was documented in both MDM as applicable and the Disposition within this note       Time User Action Codes Description Comment    2/19/2025   2:09 AM Juan Fry Add [R10.31,  R10.32] Inguinal pain of both sides                  Juan Fry MD  02/20/25 5510

## (undated) DEVICE — PAD GROUNDING ADULT

## (undated) DEVICE — POOLE SUCTION HANDLE: Brand: CARDINAL HEALTH

## (undated) DEVICE — GLOVE SRG BIOGEL ECLIPSE 7.5

## (undated) DEVICE — INTENDED FOR TISSUE SEPARATION, AND OTHER PROCEDURES THAT REQUIRE A SHARP SURGICAL BLADE TO PUNCTURE OR CUT.: Brand: BARD-PARKER SAFETY BLADES SIZE 15, STERILE

## (undated) DEVICE — SUT ETHILON 3-0 FS-1 18 IN 663G

## (undated) DEVICE — PENCILETTE SMOKE EVAC PUSH BUTTON COATED

## (undated) DEVICE — GAUZE SPONGES,16 PLY: Brand: CURITY

## (undated) DEVICE — SPONGE STICK WITH PVP-I: Brand: KENDALL

## (undated) DEVICE — SUT VICRYL 3-0 SH 27 IN J416H

## (undated) DEVICE — BETHLEHEM UNIVERSAL MINOR GEN: Brand: CARDINAL HEALTH

## (undated) DEVICE — TUBING SUCTION 5MM X 12 FT

## (undated) DEVICE — NEEDLE 22 G X 1 1/2 SAFETY

## (undated) DEVICE — SUT VICRYL 2-0 SH 27 IN UNDYED J417H

## (undated) DEVICE — GLOVE INDICATOR PI UNDERGLOVE SZ 7.5 BLUE

## (undated) DEVICE — LIGHT HANDLE COVER SLEEVE DISP BLUE STELLAR

## (undated) DEVICE — UNDYED BRAIDED (POLYGLACTIN 910), SYNTHETIC ABSORBABLE SUTURE: Brand: COATED VICRYL

## (undated) DEVICE — DRAPE EQUIPMENT RF WAND

## (undated) DEVICE — SCD SEQUENTIAL COMPRESSION COMFORT SLEEVE MEDIUM KNEE LENGTH: Brand: KENDALL SCD